# Patient Record
Sex: FEMALE | Race: WHITE | NOT HISPANIC OR LATINO | Employment: OTHER | URBAN - METROPOLITAN AREA
[De-identification: names, ages, dates, MRNs, and addresses within clinical notes are randomized per-mention and may not be internally consistent; named-entity substitution may affect disease eponyms.]

---

## 2017-01-22 DIAGNOSIS — I10 HTN (HYPERTENSION), BENIGN: Primary | ICD-10-CM

## 2017-01-23 RX ORDER — HYDROCHLOROTHIAZIDE 12.5 MG/1
12.5 TABLET ORAL DAILY
Qty: 30 TABLET | Refills: 0 | Status: SHIPPED | OUTPATIENT
Start: 2017-01-23 | End: 2017-03-16

## 2017-01-23 RX ORDER — METOPROLOL SUCCINATE 100 MG/1
100 TABLET, EXTENDED RELEASE ORAL DAILY
Qty: 30 TABLET | Refills: 0 | Status: SHIPPED | OUTPATIENT
Start: 2017-01-23 | End: 2017-03-16

## 2017-01-23 NOTE — TELEPHONE ENCOUNTER
HCTZ,     Metoprolol  Last Written Prescription Date: 10/25/16  Last Fill Quantity: 90, # refills: 0  Last Office Visit with G, [facility] or [facility] prescribing provider: 04/12/16       POTASSIUM   Date Value Ref Range Status   04/12/2016 3.7 3.4 - 5.3 mmol/L Final     CREATININE   Date Value Ref Range Status   11/11/2015 0.70 0.52 - 1.04 mg/dL Final     BP Readings from Last 3 Encounters:   07/08/16 155/92   04/12/16 138/70   11/11/15 126/80     Nicole Maloney MA

## 2017-01-23 NOTE — TELEPHONE ENCOUNTER
Routing refill request to provider for review/approval because:  Overdue for follow up appointment.    Patient notified.     Beata Diop RN, BSN

## 2017-02-07 ENCOUNTER — TRANSFERRED RECORDS (OUTPATIENT)
Dept: HEALTH INFORMATION MANAGEMENT | Facility: CLINIC | Age: 72
End: 2017-02-07

## 2017-02-08 ENCOUNTER — TRANSFERRED RECORDS (OUTPATIENT)
Dept: HEALTH INFORMATION MANAGEMENT | Facility: CLINIC | Age: 72
End: 2017-02-08

## 2017-02-09 ENCOUNTER — TELEPHONE (OUTPATIENT)
Dept: FAMILY MEDICINE | Facility: CLINIC | Age: 72
End: 2017-02-09

## 2017-02-09 NOTE — TELEPHONE ENCOUNTER
Reason for call:  Patient reporting a symptom    Symptom or request: Pain and Numbness in Right Leg      Duration (how long have symptoms been present): 3 days    Have you been treated for this before? Yes    Additional comments: she in in [place] and has gone to the ER    Phone Number patient can be reached at:  Cell number on file:    Telephone Information:   Mobile [phone]       Best Time:  Anytime    Can we leave a detailed message on this number:  YES    Call taken on 2/9/2017 at 7:05 AM by Benito Rg

## 2017-02-09 NOTE — TELEPHONE ENCOUNTER
"Call back to patient.  She reports she started having right thigh pain.  She denies injury, was seen in ED in [place] for this where nothing is found on exam, imaging, or lab work.  She states she also has \"this numb feeling in my leg.\"  She states ED provider told her it is likely a pinched nerve.  She states there is no discoloration, swelling, heat, or tenderness in any part of her leg, no limits to function either.  She will be flying back from [place] tomorrow, offered f/u with PCP next week.  Scheduled patient for 2/14, ED precautions advised for worsening symptoms.  Patient verbalizes understanding, has no further questions.  Patient will bring all results from ED visit with her to appt.  ISAÍAS sent to PCP.  Gabi Aguilar RN       "

## 2017-02-16 ENCOUNTER — OFFICE VISIT (OUTPATIENT)
Dept: FAMILY MEDICINE | Facility: CLINIC | Age: 72
End: 2017-02-16
Payer: MEDICARE

## 2017-02-16 VITALS
HEART RATE: 81 BPM | OXYGEN SATURATION: 93 % | BODY MASS INDEX: 29.73 KG/M2 | WEIGHT: 185 LBS | SYSTOLIC BLOOD PRESSURE: 166 MMHG | TEMPERATURE: 99.3 F | HEIGHT: 66 IN | DIASTOLIC BLOOD PRESSURE: 92 MMHG

## 2017-02-16 DIAGNOSIS — J06.9 UPPER RESPIRATORY TRACT INFECTION, UNSPECIFIED TYPE: Primary | ICD-10-CM

## 2017-02-16 DIAGNOSIS — M51.26 LUMBAR DISC HERNIATION: ICD-10-CM

## 2017-02-16 PROCEDURE — 99214 OFFICE O/P EST MOD 30 MIN: CPT | Performed by: PHYSICIAN ASSISTANT

## 2017-02-16 RX ORDER — METHYLPREDNISOLONE 4 MG
TABLET, DOSE PACK ORAL
COMMUNITY
Start: 2017-02-13 | End: 2018-03-08

## 2017-02-16 RX ORDER — AZITHROMYCIN 250 MG/1
TABLET, FILM COATED ORAL
Qty: 6 TABLET | Refills: 0 | Status: SHIPPED | OUTPATIENT
Start: 2017-02-16 | End: 2018-03-08

## 2017-02-16 RX ORDER — HYDROCODONE BITARTRATE AND ACETAMINOPHEN 5; 325 MG/1; MG/1
TABLET ORAL
COMMUNITY
Start: 2017-02-14 | End: 2018-03-08

## 2017-02-16 NOTE — MR AVS SNAPSHOT
"              After Visit Summary   2/16/2017    Carmel Nazario    MRN: [id number]           Patient Information     Date Of Birth          1945        Visit Information        Provider Department      2/16/2017 2:00 PM Rimma Casarez PA-C [facility]        Today's Diagnoses     Lumbar disc herniation    -  1    Upper respiratory tract infection, unspecified type           Follow-ups after your visit        Who to contact     If you have questions or need follow up information about today's clinic visit or your schedule please contact [facility] directly at [phone].  Normal or non-critical lab and imaging results will be communicated to you by [employer], letter or phone within 4 business days after the clinic has received the results. If you do not hear from us within 7 days, please contact the clinic through [employer] or phone. If you have a critical or abnormal lab result, we will notify you by phone as soon as possible.  Submit refill requests through [facility] or call your pharmacy and they will forward the refill request to us. Please allow 3 business days for your refill to be completed.          Additional Information About Your Visit        MyChart Information     [facility] [facility] [employer] gives you secure access to your electronic health record. If you see a primary care provider, you can also send messages to your care team and make appointments. If you have questions, please call your primary care clinic.  If you do not have a primary care provider, please call [phone] and they will assist you.        Care EveryWhere ID     This is your Care EveryWhere ID. This could be used by other organizations to access your [facility] medical records  [id number]        Your Vitals Were     Pulse Temperature Height Pulse Oximetry Breastfeeding? BMI (Body Mass Index)    81 99.3  F (37.4  C) (Oral) 5' 6\" (1.676 m) 93% No 29.86 kg/m2       Blood Pressure from Last 3 Encounters:   02/16/17 (!) 166/92 "   07/08/16 (!) 155/92   04/12/16 138/70    Weight from Last 3 Encounters:   02/16/17 185 lb (83.9 kg)   04/12/16 179 lb (81.2 kg)   11/11/15 183 lb 12.8 oz (83.4 kg)              Today, you had the following     No orders found for display         Today's Medication Changes          These changes are accurate as of: 2/16/17  2:21 PM.  If you have any questions, ask your nurse or doctor.               Start taking these medicines.        Dose/Directions    azithromycin 250 MG tablet   Commonly known as:  ZITHROMAX   Used for:  Upper respiratory tract infection, unspecified type   Started by:  Rimma Casarez PA-C        Two tablets first day, then one tablet daily for four days.   Quantity:  6 tablet   Refills:  0            Where to get your medicines      These medications were sent to [facility] [place], [place] - [address], [address]  [address], [address], [facility] [address]     Phone:  [phone]     azithromycin 250 MG tablet                Primary Care Provider Office Phone # Fax #    Rimma Casarez PA-C [phone]       [facility] [address]  [facility] [address]        Thank you!     Thank you for choosing [facility]  for your care. Our goal is always to provide you with excellent care. Hearing back from our patients is one way we can continue to improve our services. Please take a few minutes to complete the written survey that you may receive in the mail after your visit with us. Thank you!             Your Updated Medication List - Protect others around you: Learn how to safely use, store and throw away your medicines at www.disposemymeds.org.          This list is accurate as of: 2/16/17  2:21 PM.  Always use your most recent med list.                   Brand Name Dispense Instructions for use    azithromycin 250 MG tablet    ZITHROMAX    6 tablet    Two tablets first day, then one tablet daily for four days.        clobetasol 0.05 % cream    TEMOVATE    60 g    Apply sparingly to affected area twice daily as needed.  Do not apply to face.       hydrochlorothiazide 12.5 MG Tabs tablet     30 tablet    Take 1 tablet (12.5 mg) by mouth daily Overdue for appointment.  Call to schedule: [phone]       HYDROcodone-acetaminophen 5-325 MG per tablet    NORCO         methylPREDNISolone 4 MG tablet    MEDROL DOSEPAK         metoprolol 100 MG 24 hr tablet    TOPROL-XL    30 tablet    Take 1 tablet (100 mg) by mouth daily Overdue for appointment.  Call to schedule: [phone]       potassium chloride SA 20 MEQ CR tablet    K-DUR/KLOR-CON M    90 tablet    TAKE 1 TABLET DAILY       XALATAN 0.005 % ophthalmic solution   Generic drug:  latanoprost      1 drop At Bedtime.

## 2017-02-16 NOTE — PROGRESS NOTES
HPI: this is a pleasant 72 yo female here with complaint of cold sxs  She is leaving for [place] in a week and doesn't want to be sick for her trip    She is still having R leg numbness and pain starting acutely 2/8/17 when in [place]  Denies any falls or injuries  She was in [place] and went to ED on 2/8/17 twice for R leg numbness  She had labs and CT abd without cause found for her sxs  MRI done last week at [facility] shows herniated disc and has appt with Dr. Napier at [facility] coming up  Currently on Medrol dose pack and vicodin for the pain which does help    Her cold sxs include cough and congestion x one week  She has nasal congestion and sinus pressure x one week  Denies sore throat  Has post nasal drip but no runny nose.  Had some chills today and temp 99 today  She is exhausted from this and pain  Cough is prod of phlegm that she swallows.    Past Medical History   Diagnosis Date     Anxiety      Colon polyp      colonoscopy 8/2006     Fatty liver disease, nonalcoholic      Hip bursitis      HTN (hypertension), benign      Hyperlipidemia LDL goal < 100      OA (osteoarthritis) of hip      Dr. Garnica     Osteopenia      tried fosamax, but needed bone graft to jaw     PSVT (paroxysmal supraventricular tachycardia) (H)      Renal lithiasis      Skin cancer      BCC s/p Mohs     Past Surgical History   Procedure Laterality Date     Laparoscopic cholecystectomy       Corrective eye surgeries       C total hip arthroplasty  5-2014     R     Social History   Substance Use Topics     Smoking status: Former Smoker     Quit date: 10/24/1987     Smokeless tobacco: Never Used     Alcohol use 0.0 oz/week     0 Standard drinks or equivalent per week      Comment: 4 drinks per week     Current Outpatient Prescriptions   Medication Sig Dispense Refill     methylPREDNISolone (MEDROL DOSEPAK) 4 MG tablet        HYDROcodone-acetaminophen (NORCO) 5-325 MG per tablet        azithromycin (ZITHROMAX) 250 MG tablet Two tablets first day, then one  "tablet daily for four days. 6 tablet 0     metoprolol (TOPROL-XL) 100 MG 24 hr tablet Take 1 tablet (100 mg) by mouth daily Overdue for appointment.  Call to schedule: [phone] 30 tablet 0     hydrochlorothiazide 12.5 MG TABS tablet Take 1 tablet (12.5 mg) by mouth daily Overdue for appointment.  Call to schedule: [phone] 30 tablet 0     potassium chloride SA (K-DUR,KLOR-CON M) 20 MEQ tablet TAKE 1 TABLET DAILY 90 tablet 3     clobetasol (TEMOVATE) 0.05 % cream Apply sparingly to affected area twice daily as needed.  Do not apply to face. 60 g 1     latanoprost (XALATAN) 0.005 % ophthalmic solution 1 drop At Bedtime.       Allergies   Allergen Reactions     Fosamax [Alendronate Sodium]      Jaw problems     Penicillins      rash     Sulfa Drugs      GI upset     FAMILY HISTORY NOTED AND REVIEWED  PHYSICAL EXAM:    BP (!) 166/92 (BP Location: Right arm, Patient Position: Chair, Cuff Size: Adult Regular)  Pulse 81  Temp 99.3  F (37.4  C) (Oral)  Ht 5' 6\" (1.676 m)  Wt 185 lb (83.9 kg)  SpO2 93%  Breastfeeding? No  BMI 29.86 kg/m2  EXAM:  GENERAL APPEARANCE: healthy, alert and no distress  EYES: Eyes grossly normal to inspection, PERRL and conjunctivae and sclerae normal  HENT: ear canals and TM's normal and nose and mouth without ulcers or lesions  NECK: no adenopathy, no asymmetry, masses, or scars and thyroid normal to palpation  RESP: lungs clear to auscultation - no rales, rhonchi or wheezes  CV: regular rates and rhythm, normal S1 S2, no S3 or S4 and no murmur, click or rub  SKIN: no suspicious lesions or rashes    Assessment and Plan:     (J06.9) Upper respiratory tract infection, unspecified type  (primary encounter diagnosis)  Comment: suspect this viral.  Recd she push fluids, rest, Bartolome DM.    Plan: azithromycin (ZITHROMAX) 250 MG tablet        Antibiotic written for her to take if worse over the w/e with fever or cough.  Bring on trip in case she gets worse.  Otherwise, recd she wait this out as " suspect this viral    (M51.26) Lumbar disc herniation  Comment: reviewed her ED records from [place] and will make copies for chart  Plan: will request records from [facility]. She is on a medrol dose pack and vicodin as prescribed by them.  Has appt coming up with Dr. Napier.    Spent 30 minutes FTF with patient of which over 50% was spent discussing the coordination of care and management of their issues noted/hosp reviewed.    Rimma Casarez PA-C

## 2017-02-16 NOTE — NURSING NOTE
"Chief Complaint   Patient presents with     Sinus Problem     stuffy, headache, cough, congestion, fatigue ongoing for awhile seem to have gotten worse     Leg Pain     pain, numbness R upper leg due to herniated disc       Initial BP (!) 166/92 (BP Location: Right arm, Patient Position: Chair, Cuff Size: Adult Regular)  Pulse 81  Temp 99.3  F (37.4  C) (Oral)  Ht 5' 6\" (1.676 m)  Wt 185 lb (83.9 kg)  SpO2 93%  Breastfeeding? No  BMI 29.86 kg/m2 Estimated body mass index is 29.86 kg/(m^2) as calculated from the following:    Height as of this encounter: 5' 6\" (1.676 m).    Weight as of this encounter: 185 lb (83.9 kg).  Medication Reconciliation: complete     DAQUAN Sinclair      "

## 2017-02-18 ENCOUNTER — TELEPHONE (OUTPATIENT)
Dept: FAMILY MEDICINE | Facility: CLINIC | Age: 72
End: 2017-02-18

## 2017-02-18 NOTE — TELEPHONE ENCOUNTER
"02/18/17      Call not made patient goes to [facility] \"Rosalia\" Jerel  Central Scheduler      "

## 2017-02-20 ENCOUNTER — MYC MEDICAL ADVICE (OUTPATIENT)
Dept: FAMILY MEDICINE | Facility: CLINIC | Age: 72
End: 2017-02-20

## 2017-02-20 NOTE — TELEPHONE ENCOUNTER
For Rimma Casarez, No action needed:    To PCP,  Please see patient message.  Virgen Olivares RN

## 2017-05-31 ENCOUNTER — TRANSFERRED RECORDS (OUTPATIENT)
Dept: HEALTH INFORMATION MANAGEMENT | Facility: CLINIC | Age: 72
End: 2017-05-31

## 2017-11-02 DIAGNOSIS — I10 BENIGN HYPERTENSION: ICD-10-CM

## 2017-11-02 RX ORDER — POTASSIUM CHLORIDE 1500 MG/1
TABLET, EXTENDED RELEASE ORAL
Qty: 90 TABLET | Refills: 0 | Status: SHIPPED | OUTPATIENT
Start: 2017-11-02 | End: 2018-02-05

## 2017-11-19 DIAGNOSIS — I10 HTN (HYPERTENSION), BENIGN: ICD-10-CM

## 2017-11-21 RX ORDER — METOPROLOL SUCCINATE 100 MG/1
TABLET, EXTENDED RELEASE ORAL
Qty: 30 TABLET | Refills: 0 | Status: SHIPPED | OUTPATIENT
Start: 2017-11-21 | End: 2018-03-08

## 2017-11-21 NOTE — TELEPHONE ENCOUNTER
Medication is being filled for 1 time refill only due to:  Patient needs to be seen because overdue for physical and fasting labs

## 2017-11-28 ENCOUNTER — TRANSFERRED RECORDS (OUTPATIENT)
Dept: HEALTH INFORMATION MANAGEMENT | Facility: CLINIC | Age: 72
End: 2017-11-28

## 2017-12-04 ENCOUNTER — TRANSFERRED RECORDS (OUTPATIENT)
Dept: HEALTH INFORMATION MANAGEMENT | Facility: CLINIC | Age: 72
End: 2017-12-04

## 2017-12-19 ENCOUNTER — TELEPHONE (OUTPATIENT)
Dept: FAMILY MEDICINE | Facility: CLINIC | Age: 72
End: 2017-12-19

## 2017-12-19 NOTE — TELEPHONE ENCOUNTER
Pt had routine mammogram on 11/28/17, was to have follow up screening with additional imaging on rt breast.   Left message at [facility] imaging [phone] for report to be faxed to us.   nadercma

## 2017-12-31 DIAGNOSIS — I10 HTN (HYPERTENSION), BENIGN: ICD-10-CM

## 2018-01-02 RX ORDER — HYDROCHLOROTHIAZIDE 12.5 MG/1
TABLET ORAL
Qty: 30 TABLET | Refills: 0 | Status: SHIPPED | OUTPATIENT
Start: 2018-01-02 | End: 2018-03-08

## 2018-01-02 NOTE — TELEPHONE ENCOUNTER
Requested Prescriptions   Pending Prescriptions Disp Refills     hydrochlorothiazide 12.5 MG TABS tablet [Pharmacy Med Name: HYDROCHLOROTHIAZIDE TABS 12.5MG] 90 tablet 1    Last Written Prescription Date:  4/07/17  Last Fill Quantity: 90 tablet,  # refills: 1   Last Office Visit with [facility], [facility] or [facility] prescribing provider:  2/16/17   Future Office Visit:      Sig: TAKE 1 TABLET DAILY    Diuretics (Including Combos) Protocol Failed    12/31/2017  5:00 AM       Failed - Blood pressure under 140/90    BP Readings from Last 3 Encounters:   02/16/17 (!) 166/92   07/08/16 (!) 155/92   04/12/16 138/70                Failed - Normal serum creatinine on file in past 12 months    Recent Labs   Lab Test  11/11/15   0949   CR  0.70             Failed - Normal serum potassium on file in past 12 months    Recent Labs   Lab Test  04/12/16   1359   POTASSIUM  3.7                   Failed - Normal serum sodium on file in past 12 months    Recent Labs   Lab Test  11/11/15   0949   NA  138             Passed - Recent or future visit with authorizing provider's specialty    Patient had office visit in the last year or has a visit in the next 30 days with authorizing provider.  See chart review.              Passed - Patient is age 18 or older       Passed - No active pregancy on record       Passed - No positive pregnancy test in past 12 months

## 2018-01-02 NOTE — TELEPHONE ENCOUNTER
Medication is being filled for 1 time refill only due to:  Patient needs to be seen because due for PE and fasting labs.

## 2018-03-08 ENCOUNTER — OFFICE VISIT (OUTPATIENT)
Dept: FAMILY MEDICINE | Facility: CLINIC | Age: 73
End: 2018-03-08
Payer: MEDICARE

## 2018-03-08 VITALS
DIASTOLIC BLOOD PRESSURE: 80 MMHG | WEIGHT: 184 LBS | HEART RATE: 92 BPM | OXYGEN SATURATION: 92 % | TEMPERATURE: 97.4 F | HEIGHT: 66 IN | BODY MASS INDEX: 29.57 KG/M2 | SYSTOLIC BLOOD PRESSURE: 146 MMHG

## 2018-03-08 DIAGNOSIS — Z00.00 ENCOUNTER FOR ROUTINE ADULT HEALTH EXAMINATION WITHOUT ABNORMAL FINDINGS: Primary | ICD-10-CM

## 2018-03-08 DIAGNOSIS — R73.9 HYPERGLYCEMIA: ICD-10-CM

## 2018-03-08 DIAGNOSIS — E78.5 HYPERLIPIDEMIA WITH TARGET LDL LESS THAN 100: ICD-10-CM

## 2018-03-08 DIAGNOSIS — I47.10 PSVT (PAROXYSMAL SUPRAVENTRICULAR TACHYCARDIA) (H): ICD-10-CM

## 2018-03-08 DIAGNOSIS — K76.0 FATTY LIVER DISEASE, NONALCOHOLIC: ICD-10-CM

## 2018-03-08 DIAGNOSIS — I10 HTN (HYPERTENSION), BENIGN: ICD-10-CM

## 2018-03-08 LAB
ALBUMIN SERPL-MCNC: 4.2 G/DL (ref 3.4–5)
ALP SERPL-CCNC: 66 U/L (ref 40–150)
ALT SERPL W P-5'-P-CCNC: 72 U/L (ref 0–50)
ANION GAP SERPL CALCULATED.3IONS-SCNC: 8 MMOL/L (ref 3–14)
AST SERPL W P-5'-P-CCNC: 63 U/L (ref 0–45)
BILIRUB SERPL-MCNC: 1.6 MG/DL (ref 0.2–1.3)
BUN SERPL-MCNC: 11 MG/DL (ref 7–30)
CALCIUM SERPL-MCNC: 9.2 MG/DL (ref 8.5–10.1)
CHLORIDE SERPL-SCNC: 103 MMOL/L (ref 94–109)
CHOLEST SERPL-MCNC: 215 MG/DL
CO2 SERPL-SCNC: 26 MMOL/L (ref 20–32)
CREAT SERPL-MCNC: 0.67 MG/DL (ref 0.52–1.04)
ERYTHROCYTE [DISTWIDTH] IN BLOOD BY AUTOMATED COUNT: 12.5 % (ref 10–15)
GFR SERPL CREATININE-BSD FRML MDRD: 86 ML/MIN/1.7M2
GLUCOSE SERPL-MCNC: 128 MG/DL (ref 70–99)
HBA1C MFR BLD: 5.9 % (ref 4.3–6)
HCT VFR BLD AUTO: 45.7 % (ref 35–47)
HDLC SERPL-MCNC: 39 MG/DL
HGB BLD-MCNC: 15.4 G/DL (ref 11.7–15.7)
LDLC SERPL CALC-MCNC: 147 MG/DL
MCH RBC QN AUTO: 33.8 PG (ref 26.5–33)
MCHC RBC AUTO-ENTMCNC: 33.7 G/DL (ref 31.5–36.5)
MCV RBC AUTO: 100 FL (ref 78–100)
NONHDLC SERPL-MCNC: 176 MG/DL
PLATELET # BLD AUTO: 202 10E9/L (ref 150–450)
POTASSIUM SERPL-SCNC: 4 MMOL/L (ref 3.4–5.3)
PROT SERPL-MCNC: 7.6 G/DL (ref 6.8–8.8)
RBC # BLD AUTO: 4.55 10E12/L (ref 3.8–5.2)
SODIUM SERPL-SCNC: 137 MMOL/L (ref 133–144)
TRIGL SERPL-MCNC: 144 MG/DL
WBC # BLD AUTO: 5.6 10E9/L (ref 4–11)

## 2018-03-08 PROCEDURE — 80053 COMPREHEN METABOLIC PANEL: CPT | Performed by: PHYSICIAN ASSISTANT

## 2018-03-08 PROCEDURE — 83036 HEMOGLOBIN GLYCOSYLATED A1C: CPT | Performed by: PHYSICIAN ASSISTANT

## 2018-03-08 PROCEDURE — 80061 LIPID PANEL: CPT | Performed by: PHYSICIAN ASSISTANT

## 2018-03-08 PROCEDURE — G0438 PPPS, INITIAL VISIT: HCPCS | Performed by: PHYSICIAN ASSISTANT

## 2018-03-08 PROCEDURE — 36415 COLL VENOUS BLD VENIPUNCTURE: CPT | Performed by: PHYSICIAN ASSISTANT

## 2018-03-08 PROCEDURE — 85027 COMPLETE CBC AUTOMATED: CPT | Performed by: PHYSICIAN ASSISTANT

## 2018-03-08 RX ORDER — HYDROCHLOROTHIAZIDE 12.5 MG/1
12.5 TABLET ORAL DAILY
Qty: 90 TABLET | Refills: 3 | Status: CANCELLED | OUTPATIENT
Start: 2018-03-08

## 2018-03-08 RX ORDER — METOPROLOL SUCCINATE 100 MG/1
100 TABLET, EXTENDED RELEASE ORAL DAILY
Qty: 90 TABLET | Refills: 3 | Status: SHIPPED | OUTPATIENT
Start: 2018-03-08 | End: 2019-02-10

## 2018-03-08 RX ORDER — HYDROCHLOROTHIAZIDE 25 MG/1
25 TABLET ORAL DAILY
Qty: 90 TABLET | Refills: 3 | Status: SHIPPED | OUTPATIENT
Start: 2018-03-08 | End: 2019-02-10

## 2018-03-08 NOTE — PROGRESS NOTES
SUBJECTIVE:   Carmel Nazario is a 72 year old female who presents for Preventive Visit.    Pt states her BP runs high initially but eventually comes down  She was just on a cruise so wasn't watching her diet  Colonoscopy and mammogram up to date.  She has hx of skin ca and overdue for exam  Are you in the first 12 months of your Medicare Part B coverage?  No    Healthy Habits:    Do you get at least three servings of calcium containing foods daily (dairy, green leafy vegetables, etc.)? yes    Amount of exercise or daily activities, outside of work: 3 day(s) per week water aerobics then walking in nicer weather    Problems taking medications regularly No, but did not take HCTZ this morning since fasting    Medication side effects: No    Have you had an eye exam in the past two years? yes    Do you see a dentist twice per year? yes    Do you have sleep apnea, excessive snoring or daytime drowsiness?no      Ability to successfully perform activities of daily living: Yes, no assistance needed    Home safety:  none identified     Hearing impairment: No    Fall risk:  Fallen 2 or more times in the past year?: No  Any fall with injury in the past year?: No        COGNITIVE SCREEN  1) Repeat 3 items (Banana, Sunrise, Chair)    2) Clock draw: NORMAL  3) 3 item recall: Recalls 3 objects  Results: 3 items recalled: COGNITIVE IMPAIRMENT LESS LIKELY    Mini-CogTM Copyright S Ha. Licensed by the author for use in [facility]; reprinted with permission ([email or web address]). All rights reserved.      Reviewed and updated as needed this visit by clinical staff  Tobacco  Allergies  Meds         Reviewed and updated as needed this visit by Provider  Allergies        Social History   Substance Use Topics     Smoking status: Former Smoker     Quit date: 10/24/1987     Smokeless tobacco: Never Used     Alcohol use 0.0 oz/week     0 Standard drinks or equivalent per week      Comment: 4 drinks per week       If you drink  alcohol do you typically have >3 drinks per day or >7 drinks per week? No                        Today's PHQ-2 Score:   PHQ-2 ( 1999 [employer]) 3/8/2018 3/8/2018   Q1: Little interest or pleasure in doing things 0 0   Q2: Feeling down, depressed or hopeless 0 0   PHQ-2 Score 0 0       Do you feel safe in your environment - Yes    Do you have a Health Care Directive?: Yes: Advance Directive has been received and scanned.    Current providers sharing in care for this patient include:   Patient Care Team:  Rimma Casarez PA-C as PCP - General (Internal Medicine)    The following health maintenance items are reviewed in Epic and correct as of today:  Health Maintenance   Topic Date Due     MEDICARE ANNUAL WELLNESS VISIT  09/10/1963     HEPATITIS C SCREENING  [birth year]10/1963     FALL RISK ASSESSMENT  04/12/2017     ADVANCE DIRECTIVE PLANNING Q5 YRS  08/02/2017     INFLUENZA VACCINE (SYSTEM ASSIGNED)  09/01/2018     MAMMO SCREEN Q2 YR (SYSTEM ASSIGNED)  12/04/2019     LIPID SCREEN Q5 YR FEMALE (SYSTEM ASSIGNED)  11/11/2020     COLONOSCOPY Q5 YR  05/31/2022     TETANUS IMMUNIZATION (SYSTEM ASSIGNED)  11/01/2022     DEXA SCAN SCREENING (SYSTEM ASSIGNED)  Completed     PNEUMOCOCCAL  Completed     Past Medical History:   Diagnosis Date     Anxiety      Colon polyp     colonoscopy 8/2006     Fatty liver disease, nonalcoholic      Hip bursitis      HTN (hypertension), benign      Hyperlipidemia LDL goal < 100      OA (osteoarthritis) of hip     Dr. Garnica     Osteopenia     tried fosamax, but needed bone graft to jaw     PSVT (paroxysmal supraventricular tachycardia) (H)      Renal lithiasis      Skin cancer     BCC s/p Mohs     Past Surgical History:   Procedure Laterality Date     C TOTAL HIP ARTHROPLASTY  5-2014    R     corrective eye surgeries       LAPAROSCOPIC CHOLECYSTECTOMY       Current Outpatient Prescriptions   Medication Sig Dispense Refill     metoprolol succinate (TOPROL-XL) 100 MG 24 hr tablet Take 1 tablet (100 mg)  "by mouth daily 90 tablet 3     hydrochlorothiazide (HYDRODIURIL) 25 MG tablet Take 1 tablet (25 mg) by mouth daily 90 tablet 3     potassium chloride SA (K-DUR/KLOR-CON M) 20 MEQ CR tablet Take 1 tablet (20 mEq) by mouth daily Due for labs 90 tablet 0     latanoprost (XALATAN) 0.005 % ophthalmic solution 1 drop At Bedtime.       [DISCONTINUED] hydrochlorothiazide 12.5 MG TABS tablet TAKE 1 TABLET DAILY 30 tablet 0     [DISCONTINUED] metoprolol (TOPROL-XL) 100 MG 24 hr tablet TAKE 1 TABLET DAILY 30 tablet 0       ROS:  Constitutional, HEENT, cardiovascular, pulmonary, GI, , musculoskeletal, neuro, skin, endocrine and psych systems are negative, except as otherwise noted.    OBJECTIVE:   /80 (BP Location: Left arm, Patient Position: Chair, Cuff Size: Adult Regular)  Pulse 92  Temp 97.4  F (36.3  C) (Tympanic)  Ht 5' 6\" (1.676 m)  Wt 184 lb (83.5 kg)  SpO2 92%  Breastfeeding? No  BMI 29.7 kg/m2 Estimated body mass index is 29.7 kg/(m^2) as calculated from the following:    Height as of this encounter: 5' 6\" (1.676 m).    Weight as of this encounter: 184 lb (83.5 kg).  EXAM:   GENERAL APPEARANCE: healthy, alert and no distress  EYES: Eyes grossly normal to inspection, PERRL and conjunctivae and sclerae normal  HENT: ear canals and TM's normal, nose and mouth without ulcers or lesions, oropharynx clear and oral mucous membranes moist  NECK: no adenopathy, no asymmetry, masses, or scars and thyroid normal to palpation  RESP: lungs clear to auscultation - no rales, rhonchi or wheezes  BREAST: normal without masses, tenderness or nipple discharge and no palpable axillary masses or adenopathy  CV: regular rate and rhythm, normal S1 S2, no S3 or S4, no murmur, click or rub, no peripheral edema and peripheral pulses strong  ABDOMEN: soft, nontender, no hepatosplenomegaly, no masses and bowel sounds normal  MS: no musculoskeletal defects are noted and gait is age appropriate without ataxia  SKIN: no suspicious " "lesions or rashes  NEURO: Normal strength and tone, sensory exam grossly normal, mentation intact and speech normal  PSYCH: mentation appears normal and affect normal/bright    ASSESSMENT / PLAN:   Assessment and Plan:     (Z00.00) Encounter for routine adult health examination without abnormal findings  (primary encounter diagnosis)  Comment: mammogram and colonoscopy up to date. Declines DEXA  Plan: CBC with platelets        immun up to date    (I10) HTN (hypertension), benign  Comment: not well controlled, increased hctz to 25mg qd. F/u one month with readings.  It did come down some on recheck in office today.  Plan: metoprolol succinate (TOPROL-XL) 100 MG 24 hr         tablet, Comprehensive metabolic panel,         hydrochlorothiazide (HYDRODIURIL) 25 MG tablet            (E78.5) Hyperlipidemia with target LDL less than 100  Comment:   Plan: Lipid Profile            (I47.1) PSVT (paroxysmal supraventricular tachycardia) (H)  Comment:   Plan: stable, asymptomatic    (K76.0) Fatty liver disease, nonalcoholic  Comment:   Plan: CMP today    (R73.9) Hyperglycemia  Comment:   Plan: Hemoglobin A1c              End of Life Planning:  Patient currently has an advanced directive: No.  I have verified the patient's ablity to prepare an advanced directive/make health care decisions.  Literature was provided to assist patient in preparing an advanced directive.    COUNSELING:  Reviewed preventive health counseling, as reflected in patient instructions        Estimated body mass index is 29.7 kg/(m^2) as calculated from the following:    Height as of this encounter: 5' 6\" (1.676 m).    Weight as of this encounter: 184 lb (83.5 kg).       reports that she quit smoking about 30 years ago. She has never used smokeless tobacco.      Appropriate preventive services were discussed with this patient, including applicable screening as appropriate for cardiovascular disease, diabetes, osteopenia/osteoporosis, and glaucoma.  As " appropriate for age/gender, discussed screening for colorectal cancer, prostate cancer, breast cancer, and cervical cancer. Checklist reviewing preventive services available has been given to the patient.    Reviewed patients plan of care and provided an AVS. The Basic Care Plan (routine screening as documented in Health Maintenance) for Carmel meets the Care Plan requirement. This Care Plan has been established and reviewed with the Patient.    Counseling Resources:  ATP IV Guidelines  Pooled Cohorts Equation Calculator  Breast Cancer Risk Calculator  FRAX Risk Assessment  ICSI Preventive Guidelines  Dietary Guidelines for Americans, 2010  USDA's MyPlate  ASA Prophylaxis  Lung CA Screening    Rimma Casarez PA-C  [facility]

## 2018-03-08 NOTE — NURSING NOTE
"Chief Complaint   Patient presents with     Wellness Visit       Initial /80 (BP Location: Left arm, Patient Position: Chair, Cuff Size: Adult Regular)  Pulse 92  Temp 97.4  F (36.3  C) (Tympanic)  Ht 5' 6\" (1.676 m)  Wt 184 lb (83.5 kg)  SpO2 92%  Breastfeeding? No  BMI 29.7 kg/m2 Estimated body mass index is 29.7 kg/(m^2) as calculated from the following:    Height as of this encounter: 5' 6\" (1.676 m).    Weight as of this encounter: 184 lb (83.5 kg).  Medication Reconciliation: complete    "

## 2018-03-08 NOTE — MR AVS SNAPSHOT
After Visit Summary   3/8/2018    Carmel Nazario    MRN: [id number]           Patient Information     Date Of Birth          1945        Visit Information        Provider Department      3/8/2018 9:30 AM Rimma Casarez PA-C [facility]        Today's Diagnoses     Encounter for routine adult health examination without abnormal findings    -  1    HTN (hypertension), benign        Hyperlipidemia with target LDL less than 100        PSVT (paroxysmal supraventricular tachycardia) (H)        Fatty liver disease, nonalcoholic        Hyperglycemia          Care Instructions      Preventive Health Recommendations    Female Ages 65 +    Yearly exam:     See your health care provider every year in order to  o Review health changes.   o Discuss preventive care.    o Review your medicines if your doctor has prescribed any.      You no longer need a yearly Pap test unless you've had an abnormal Pap test in the past 10 years. If you have vaginal symptoms, such as bleeding or discharge, be sure to talk with your provider about a Pap test.      Every 1 to 2 years, have a mammogram.  If you are over 69, talk with your health care provider about whether or not you want to continue having screening mammograms.      Every 10 years, have a colonoscopy. Or, have a yearly FIT test (stool test). These exams will check for colon cancer.       Have a cholesterol test every 5 years, or more often if your doctor advises it.       Have a diabetes test (fasting glucose) every three years. If you are at risk for diabetes, you should have this test more often.       At age 65, have a bone density scan (DEXA) to check for osteoporosis (brittle bone disease).    Shots:    Get a flu shot each year.    Get a tetanus shot every 10 years.    Talk to your doctor about your pneumonia vaccines. There are now two you should receive - Pneumovax (PPSV 23) and Prevnar (PCV 13).    Talk to your doctor about the shingles  vaccine.    Talk to your doctor about the hepatitis B vaccine.    Nutrition:     Eat at least 5 servings of fruits and vegetables each day.      Eat whole-grain bread, whole-wheat pasta and brown rice instead of white grains and rice.      Talk to your provider about Calcium and Vitamin D.     Lifestyle    Exercise at least 150 minutes a week (30 minutes a day, 5 days a week). This will help you control your weight and prevent disease.      Limit alcohol to one drink per day.      No smoking.       Wear sunscreen to prevent skin cancer.       See your dentist twice a year for an exam and cleaning.      See your eye doctor every 1 to 2 years to screen for conditions such as glaucoma, macular degeneration and cataracts.          Follow-ups after your visit        Who to contact     If you have questions or need follow up information about today's clinic visit or your schedule please contact [facility] directly at [phone].  Normal or non-critical lab and imaging results will be communicated to you by MyChart, letter or phone within 4 business days after the clinic has received the results. If you do not hear from us within 7 days, please contact the clinic through ActiveSechart or phone. If you have a critical or abnormal lab result, we will notify you by phone as soon as possible.  Submit refill requests through G3 or call your pharmacy and they will forward the refill request to us. Please allow 3 business days for your refill to be completed.          Additional Information About Your Visit        ActiveSecharAiming Information     G3 gives you secure access to your electronic health record. If you see a primary care provider, you can also send messages to your care team and make appointments. If you have questions, please call your primary care clinic.  If you do not have a primary care provider, please call [phone] and they will assist you.        Care EveryWhere ID     This is your Care EveryWhere  "ID. This could be used by other organizations to access your [facility] medical records  [id number]        Your Vitals Were     Pulse Height Pulse Oximetry Breastfeeding? BMI (Body Mass Index)       92 5' 6\" (1.676 m) 92% No 29.7 kg/m2        Blood Pressure from Last 3 Encounters:   03/08/18 (!) 171/95   02/16/17 (!) 166/92   07/08/16 (!) 155/92    Weight from Last 3 Encounters:   03/08/18 184 lb (83.5 kg)   02/16/17 185 lb (83.9 kg)   04/12/16 179 lb (81.2 kg)              We Performed the Following     CBC with platelets     Comprehensive metabolic panel     Hemoglobin A1c     Lipid Profile          Today's Medication Changes          These changes are accurate as of 3/8/18  9:55 AM.  If you have any questions, ask your nurse or doctor.               These medicines have changed or have updated prescriptions.        Dose/Directions    * hydrochlorothiazide 12.5 MG Tabs tablet   This may have changed:  Another medication with the same name was added. Make sure you understand how and when to take each.   Used for:  HTN (hypertension), benign   Changed by:  Rimma Casarez PA-C        TAKE 1 TABLET DAILY   Quantity:  30 tablet   Refills:  0       * hydrochlorothiazide 25 MG tablet   Commonly known as:  HYDRODIURIL   This may have changed:  You were already taking a medication with the same name, and this prescription was added. Make sure you understand how and when to take each.   Used for:  HTN (hypertension), benign   Changed by:  Rimma Casarez PA-C        Dose:  25 mg   Take 1 tablet (25 mg) by mouth daily   Quantity:  90 tablet   Refills:  3       metoprolol succinate 100 MG 24 hr tablet   Commonly known as:  TOPROL-XL   This may have changed:  See the new instructions.   Used for:  HTN (hypertension), benign   Changed by:  Rimma Casarez PA-C        Dose:  100 mg   Take 1 tablet (100 mg) by mouth daily   Quantity:  90 tablet   Refills:  3       potassium chloride SA 20 MEQ CR tablet   Commonly known as: "  K-JOSÉ MIGUEL/KLOR-CON M   This may have changed:  Another medication with the same name was removed. Continue taking this medication, and follow the directions you see here.   Used for:  Benign hypertension   Changed by:  Rimma Casarez PA-C        Dose:  20 mEq   Take 1 tablet (20 mEq) by mouth daily Due for labs   Quantity:  90 tablet   Refills:  0       * Notice:  This list has 2 medication(s) that are the same as other medications prescribed for you. Read the directions carefully, and ask your doctor or other care provider to review them with you.         Where to get your medicines      These medications were sent to [facility] - [address]  [address] [place] [id number]     Phone:  [phone]     hydrochlorothiazide 25 MG tablet    metoprolol succinate 100 MG 24 hr tablet                Primary Care Provider Office Phone # Fax #    Rimma Casarez PA-C [phone] [address] [address]        Equal Access to Services     [facility]: Hadii aad ku hadasho Soomaali, waaxda luqadaha, qaybta kaalmada adeegyada, waxay idiin hayimanin brendan martel . So [facility] [phone].    ATENCIÓN: Si habla español, tiene a johnson disposición servicios gratuitos de asistencia lingüística. [facility] [phone].    We comply with applicable federal civil rights laws and [place] laws. We do not discriminate on the basis of race, color, national origin, age, disability, sex, sexual orientation, or gender identity.            Thank you!     Thank you for choosing [facility]  for your care. Our goal is always to provide you with excellent care. Hearing back from our patients is one way we can continue to improve our services. Please take a few minutes to complete the written survey that you may receive in the mail after your visit with us. Thank you!             Your Updated Medication List - Protect others around you:  Learn how to safely use, store and throw away your medicines at www.disposemymeds.org.          This list is accurate as of 3/8/18  9:55 AM.  Always use your most recent med list.                   Brand Name Dispense Instructions for use Diagnosis    * hydrochlorothiazide 12.5 MG Tabs tablet     30 tablet    TAKE 1 TABLET DAILY    HTN (hypertension), benign       * hydrochlorothiazide 25 MG tablet    HYDRODIURIL    90 tablet    Take 1 tablet (25 mg) by mouth daily    HTN (hypertension), benign       metoprolol succinate 100 MG 24 hr tablet    TOPROL-XL    90 tablet    Take 1 tablet (100 mg) by mouth daily    HTN (hypertension), benign       potassium chloride SA 20 MEQ CR tablet    K-DUR/KLOR-CON M    90 tablet    Take 1 tablet (20 mEq) by mouth daily Due for labs    Benign hypertension       XALATAN 0.005 % ophthalmic solution   Generic drug:  latanoprost      1 drop At Bedtime.        * Notice:  This list has 2 medication(s) that are the same as other medications prescribed for you. Read the directions carefully, and ask your doctor or other care provider to review them with you.

## 2018-03-13 NOTE — PROGRESS NOTES
"Carmel,    Your blood sugar was 128 with an A1c of 5.9% so that indicates prediabetes so watch the diet and try to lose weight.  Your electrolytes and kidney functions were normal.  Your liver functions (ALT/AST) were elevated which is likely due to your known fatty liver disease. The treatment for that is also weight loss.    Your cholesterol is elevated. Your LDL or \"bad cholesterol\" should be <130 and is 147.  I would like to put you on cholesterol medication with your permission. Please let me know.  This will help reduce your risk of heart attack and stroke.    Your hemoglobin and white blood cell count were in normal range.    Please get back to me regarding the cholesterol medication because I would want you on board with this before I put in the order.    Rimma Casarez PA-C      "

## 2018-05-14 DIAGNOSIS — I10 BENIGN HYPERTENSION: ICD-10-CM

## 2018-05-14 NOTE — TELEPHONE ENCOUNTER
" potassium chloride SA (K-DUR/KLOR-CON M) 20 MEQ CR tablet 90 tablet 0 2/5/2018     Last Written Prescription Date:  2/5/18  Last Fill Quantity: 90,  # refills: 0   Last office visit: 3/8/2018 with prescribing provider:  Rimma   Future Office Visit:  none  Requested Prescriptions   Pending Prescriptions Disp Refills     potassium chloride SA (K-DUR/KLOR-CON M) 20 MEQ CR tablet [Pharmacy Med Name: POT CHLOR ER TABS 20MEQ] 90 tablet 0     Sig: TAKE 1 TABLET DAILY (DUE FOR LABS)    Potassium Supplements Protocol Passed    5/14/2018 10:54 AM       Passed - Recent (12 mo) or future (30 days) visit within the authorizing provider's specialty    Patient had office visit in the last 12 months or has a visit in the next 30 days with authorizing provider or within the authorizing provider's specialty.  See \"Patient Info\" tab in inbasket, or \"Choose Columns\" in Meds & Orders section of the refill encounter.           Passed - Patient is age 18 or older       Passed - Normal serum potassium in past 12 months    Recent Labs   Lab Test  03/08/18   1018   POTASSIUM  4.0                    No flowsheet data found.  "

## 2018-05-15 RX ORDER — POTASSIUM CHLORIDE 1500 MG/1
TABLET, EXTENDED RELEASE ORAL
Qty: 90 TABLET | Refills: 1 | Status: SHIPPED | OUTPATIENT
Start: 2018-05-15 | End: 2018-11-06

## 2018-05-15 NOTE — TELEPHONE ENCOUNTER
Prescription approved per [facility] Refill Protocol.  Janine Freeman RN

## 2018-11-06 DIAGNOSIS — I10 BENIGN HYPERTENSION: ICD-10-CM

## 2018-11-06 RX ORDER — POTASSIUM CHLORIDE 1500 MG/1
20 TABLET, EXTENDED RELEASE ORAL DAILY
Qty: 90 TABLET | Refills: 0 | Status: SHIPPED | OUTPATIENT
Start: 2018-11-06 | End: 2019-02-04

## 2018-11-06 NOTE — TELEPHONE ENCOUNTER
Prescription approved per [facility] Refill Protocol.  Radha AL RN

## 2018-11-06 NOTE — TELEPHONE ENCOUNTER
"potassium chloride SA (K-DUR/KLOR-CON M) 20 MEQ CR tablet 90 tablet 1 5/15/2018     Last Written Prescription Date:  5/15/18  Last Fill Quantity: 90,  # refills: 1   Last office visit: 3/8/2018 with prescribing provider:  Leida   Future Office Visit:  none  Requested Prescriptions   Pending Prescriptions Disp Refills     potassium chloride SA (K-DUR/KLOR-CON M) 20 MEQ CR tablet [Pharmacy Med Name: POT CHLOR ER TABS 20MEQ] 90 tablet 1     Sig: TAKE 1 TABLET DAILY (DUE FOR LABS)    Potassium Supplements Protocol Passed    11/6/2018  1:16 AM       Passed - Recent (12 mo) or future (30 days) visit within the authorizing provider's specialty    Patient had office visit in the last 12 months or has a visit in the next 30 days with authorizing provider or within the authorizing provider's specialty.  See \"Patient Info\" tab in inbasket, or \"Choose Columns\" in Meds & Orders section of the refill encounter.             Passed - Patient is age 18 or older       Passed - Normal serum potassium in past 12 months    Recent Labs   Lab Test  03/08/18   1018   POTASSIUM  4.0                    No flowsheet data found.  "

## 2018-12-06 ENCOUNTER — TRANSFERRED RECORDS (OUTPATIENT)
Dept: HEALTH INFORMATION MANAGEMENT | Facility: CLINIC | Age: 73
End: 2018-12-06

## 2019-02-04 DIAGNOSIS — I10 BENIGN HYPERTENSION: ICD-10-CM

## 2019-02-05 NOTE — TELEPHONE ENCOUNTER
"Pending Prescriptions:                       Disp   Refills    potassium chloride ER (K-DUR/KLOR-CON M) *90 tab*0            Sig: TAKE 1 TABLET DAILY    Last Written Prescription Date:  11/6/18  Last Fill Quantity: 90,  # refills: 0   Last office visit: 3/8/2018 with prescribing provider:     Future Office Visit:    Requested Prescriptions   Pending Prescriptions Disp Refills     potassium chloride ER (K-DUR/KLOR-CON M) 20 MEQ CR tablet [Pharmacy Med Name: POT CHLOR ER TABS 20MEQ] 90 tablet 0     Sig: TAKE 1 TABLET DAILY    Potassium Supplements Protocol Passed - 2/4/2019 11:25 PM       Passed - Recent (12 mo) or future (30 days) visit within the authorizing provider's specialty    Patient had office visit in the last 12 months or has a visit in the next 30 days with authorizing provider or within the authorizing provider's specialty.  See \"Patient Info\" tab in inbasket, or \"Choose Columns\" in Meds & Orders section of the refill encounter.             Passed - Medication is active on med list       Passed - Patient is age 18 or older       Passed - Normal serum potassium in past 12 months    Recent Labs   Lab Test 03/08/18  1018   POTASSIUM 4.0                      "

## 2019-02-06 RX ORDER — POTASSIUM CHLORIDE 1500 MG/1
TABLET, EXTENDED RELEASE ORAL
Qty: 30 TABLET | Refills: 0 | Status: SHIPPED | OUTPATIENT
Start: 2019-02-06 | End: 2019-03-21

## 2019-02-06 NOTE — TELEPHONE ENCOUNTER
Pt is due for annual OV. Refilled #30 with note to pharmacy to inform patient to schedule an appointment     Jay LEPE RN

## 2019-02-10 DIAGNOSIS — I10 HTN (HYPERTENSION), BENIGN: ICD-10-CM

## 2019-02-11 NOTE — TELEPHONE ENCOUNTER
"hydrochlorothiazide (HYDRODIURIL) 25 MG tablet  Last Written Prescription Date:  3/8/18  Last Fill Quantity: 90,  # refills: 3   Last office visit: 3/8/2018 with prescribing provider:  kortney Barth Office Visit:        metoprolol succinate (TOPROL-XL) 100 MG 24 hr tablet  Last Written Prescription Date:  3/8/18  Last Fill Quantity: 90,  # refills: 3   Last office visit: 3/8/2018 with prescribing provider:  kortney Barth Office Visit:              Requested Prescriptions   Pending Prescriptions Disp Refills     hydrochlorothiazide (HYDRODIURIL) 25 MG tablet [Pharmacy Med Name: HYDROCHLOROTHIAZIDE TAB 25MG] 90 tablet 3     Sig: TAKE 1 TABLET DAILY    Diuretics (Including Combos) Protocol Failed - 2/10/2019 11:27 PM       Failed - Blood pressure under 140/90 in past 12 months    BP Readings from Last 3 Encounters:   03/08/18 146/80   02/16/17 (!) 166/92   07/08/16 146/86                Passed - Recent (12 mo) or future (30 days) visit within the authorizing provider's specialty    Patient had office visit in the last 12 months or has a visit in the next 30 days with authorizing provider or within the authorizing provider's specialty.  See \"Patient Info\" tab in inbasket, or \"Choose Columns\" in Meds & Orders section of the refill encounter.             Passed - Medication is active on med list       Passed - Patient is age 18 or older       Passed - No active pregancy on record       Passed - Normal serum creatinine on file in past 12 months    Recent Labs   Lab Test 03/08/18  1018   CR 0.67             Passed - Normal serum potassium on file in past 12 months    Recent Labs   Lab Test 03/08/18  1018   POTASSIUM 4.0                   Passed - Normal serum sodium on file in past 12 months    Recent Labs   Lab Test 03/08/18  1018                Passed - No positive pregnancy test in past 12 months        metoprolol succinate ER (TOPROL-XL) 100 MG 24 hr tablet [Pharmacy Med Name: METOPROLOL SUCCINATE ER TABS 100MG] 90 " "tablet 3     Sig: TAKE 1 TABLET DAILY    Beta-Blockers Protocol Failed - 2/10/2019 11:27 PM       Failed - Blood pressure under 140/90 in past 12 months    BP Readings from Last 3 Encounters:   03/08/18 146/80   02/16/17 (!) 166/92   07/08/16 146/86                Passed - Patient is age 6 or older       Passed - Recent (12 mo) or future (30 days) visit within the authorizing provider's specialty    Patient had office visit in the last 12 months or has a visit in the next 30 days with authorizing provider or within the authorizing provider's specialty.  See \"Patient Info\" tab in inbasket, or \"Choose Columns\" in Meds & Orders section of the refill encounter.             Passed - Medication is active on med list          "

## 2019-02-12 RX ORDER — HYDROCHLOROTHIAZIDE 25 MG/1
25 TABLET ORAL DAILY
Qty: 30 TABLET | Refills: 0 | Status: SHIPPED | OUTPATIENT
Start: 2019-02-12 | End: 2019-03-21

## 2019-02-12 RX ORDER — METOPROLOL SUCCINATE 100 MG/1
100 TABLET, EXTENDED RELEASE ORAL DAILY
Qty: 30 TABLET | Refills: 0 | Status: SHIPPED | OUTPATIENT
Start: 2019-02-12 | End: 2019-03-21

## 2019-02-12 NOTE — TELEPHONE ENCOUNTER
Medication is being filled for 1 time refill only due to:  Patient needs to be seen because:  Due for annual exam 3/2019  Last BP elevated     Radha AL RN

## 2019-03-18 ASSESSMENT — ACTIVITIES OF DAILY LIVING (ADL): CURRENT_FUNCTION: NO ASSISTANCE NEEDED

## 2019-03-20 NOTE — PATIENT INSTRUCTIONS
Preventive Health Recommendations    See your health care provider every year to    Review health changes.     Discuss preventive care.      Review your medicines if your doctor has prescribed any.      You no longer need a yearly Pap test unless you've had an abnormal Pap test in the past 10 years. If you have vaginal symptoms, such as bleeding or discharge, be sure to talk with your provider about a Pap test.      Every 1 to 2 years, have a mammogram.  If you are over 69, talk with your health care provider about whether or not you want to continue having screening mammograms.      Every 10 years, have a colonoscopy. Or, have a yearly FIT test (stool test). These exams will check for colon cancer.       Have a cholesterol test every 5 years, or more often if your doctor advises it.       Have a diabetes test (fasting glucose) every three years. If you are at risk for diabetes, you should have this test more often.       At age 65, have a bone density scan (DEXA) to check for osteoporosis (brittle bone disease).    Shots:    Get a flu shot each year.    Get a tetanus shot every 10 years.    Talk to your doctor about your pneumonia vaccines. There are now two you should receive - Pneumovax (PPSV 23) and Prevnar (PCV 13).    Talk to your pharmacist about the shingles vaccine.    Talk to your doctor about the hepatitis B vaccine.    Nutrition:     Eat at least 5 servings of fruits and vegetables each day.      Eat whole-grain bread, whole-wheat pasta and brown rice instead of white grains and rice.      Get adequate Calcium and Vitamin D.     Lifestyle    Exercise at least 150 minutes a week (30 minutes a day, 5 days a week). This will help you control your weight and prevent disease.      Limit alcohol to one drink per day.      No smoking.       Wear sunscreen to prevent skin cancer.       See your dentist twice a year for an exam and cleaning.      See your eye doctor every 1 to 2 years to screen for conditions  such as glaucoma, macular degeneration and cataracts.    Personalized Prevention Plan  You are due for the preventive services outlined below.  Your care team is available to assist you in scheduling these services.  If you have already completed any of these items, please share that information with your care team to update in your medical record.  Health Maintenance Due   Topic Date Due     Hepatitis C Screening  09/10/1963     Zoster (Shingles) Vaccine (2 of 3) 08/04/2009     Discuss Advance Directive Planning  08/02/2017     Flu Vaccine (1) 09/01/2018     Annual Wellness Visit  03/08/2019     FALL RISK ASSESSMENT  03/08/2019       Shingrex is the new shingles shot; check with insurance.  Bone density

## 2019-03-20 NOTE — PROGRESS NOTES
"  Answers for HPI/ROS submitted by the patient on 3/18/2019   Annual Exam:  In general, how would you rate your overall physical health?: good  Frequency of exercise:: 2-3 days/week  Do you usually eat at least 4 servings of fruit and vegetables a day, include whole grains & fiber, and avoid regularly eating high fat or \"junk\" foods? : Yes  Taking medications regularly:: Yes  Medication side effects:: None  Activities of Daily Living: no assistance needed  Home safety: no safety concerns identified  Hearing Impairment:: no hearing concerns  In the past 6 months, have you been bothered by leaking of urine?: Yes  In general, how would you rate your overall mental or emotional health?: good  Additional concerns today:: No  Duration of exercise:: 15-30 minutes    "

## 2019-03-21 ENCOUNTER — OFFICE VISIT (OUTPATIENT)
Dept: FAMILY MEDICINE | Facility: CLINIC | Age: 74
End: 2019-03-21
Payer: MEDICARE

## 2019-03-21 VITALS
WEIGHT: 190 LBS | HEIGHT: 66 IN | DIASTOLIC BLOOD PRESSURE: 86 MMHG | HEART RATE: 68 BPM | TEMPERATURE: 99.5 F | BODY MASS INDEX: 30.53 KG/M2 | SYSTOLIC BLOOD PRESSURE: 167 MMHG | OXYGEN SATURATION: 95 %

## 2019-03-21 DIAGNOSIS — R73.9 HYPERGLYCEMIA: ICD-10-CM

## 2019-03-21 DIAGNOSIS — Z00.00 MEDICARE ANNUAL WELLNESS VISIT, SUBSEQUENT: Primary | ICD-10-CM

## 2019-03-21 DIAGNOSIS — I47.10 PSVT (PAROXYSMAL SUPRAVENTRICULAR TACHYCARDIA) (H): ICD-10-CM

## 2019-03-21 DIAGNOSIS — I10 HTN (HYPERTENSION), BENIGN: ICD-10-CM

## 2019-03-21 DIAGNOSIS — E11.9 TYPE 2 DIABETES MELLITUS WITHOUT COMPLICATION, WITHOUT LONG-TERM CURRENT USE OF INSULIN (H): ICD-10-CM

## 2019-03-21 DIAGNOSIS — E78.5 HYPERLIPIDEMIA WITH TARGET LDL LESS THAN 100: ICD-10-CM

## 2019-03-21 DIAGNOSIS — Z11.59 NEED FOR HEPATITIS C SCREENING TEST: ICD-10-CM

## 2019-03-21 LAB
ALBUMIN SERPL-MCNC: 4.3 G/DL (ref 3.4–5)
ALP SERPL-CCNC: 64 U/L (ref 40–150)
ALT SERPL W P-5'-P-CCNC: 76 U/L (ref 0–50)
ANION GAP SERPL CALCULATED.3IONS-SCNC: 8 MMOL/L (ref 3–14)
AST SERPL W P-5'-P-CCNC: 54 U/L (ref 0–45)
BILIRUB SERPL-MCNC: 1.3 MG/DL (ref 0.2–1.3)
BUN SERPL-MCNC: 13 MG/DL (ref 7–30)
CALCIUM SERPL-MCNC: 9.1 MG/DL (ref 8.5–10.1)
CHLORIDE SERPL-SCNC: 103 MMOL/L (ref 94–109)
CHOLEST SERPL-MCNC: 204 MG/DL
CO2 SERPL-SCNC: 26 MMOL/L (ref 20–32)
CREAT SERPL-MCNC: 0.64 MG/DL (ref 0.52–1.04)
ERYTHROCYTE [DISTWIDTH] IN BLOOD BY AUTOMATED COUNT: 12.5 % (ref 10–15)
GFR SERPL CREATININE-BSD FRML MDRD: 89 ML/MIN/{1.73_M2}
GLUCOSE SERPL-MCNC: 149 MG/DL (ref 70–99)
HBA1C MFR BLD: 6.7 % (ref 0–5.6)
HCT VFR BLD AUTO: 42.8 % (ref 35–47)
HDLC SERPL-MCNC: 33 MG/DL
HGB BLD-MCNC: 15 G/DL (ref 11.7–15.7)
LDLC SERPL CALC-MCNC: 137 MG/DL
MCH RBC QN AUTO: 34.3 PG (ref 26.5–33)
MCHC RBC AUTO-ENTMCNC: 35 G/DL (ref 31.5–36.5)
MCV RBC AUTO: 98 FL (ref 78–100)
NONHDLC SERPL-MCNC: 171 MG/DL
PLATELET # BLD AUTO: 192 10E9/L (ref 150–450)
POTASSIUM SERPL-SCNC: 3.8 MMOL/L (ref 3.4–5.3)
PROT SERPL-MCNC: 7.6 G/DL (ref 6.8–8.8)
RBC # BLD AUTO: 4.37 10E12/L (ref 3.8–5.2)
SODIUM SERPL-SCNC: 137 MMOL/L (ref 133–144)
TRIGL SERPL-MCNC: 172 MG/DL
WBC # BLD AUTO: 5.7 10E9/L (ref 4–11)

## 2019-03-21 PROCEDURE — 83036 HEMOGLOBIN GLYCOSYLATED A1C: CPT | Performed by: PHYSICIAN ASSISTANT

## 2019-03-21 PROCEDURE — G0439 PPPS, SUBSEQ VISIT: HCPCS | Performed by: PHYSICIAN ASSISTANT

## 2019-03-21 PROCEDURE — 80053 COMPREHEN METABOLIC PANEL: CPT | Performed by: PHYSICIAN ASSISTANT

## 2019-03-21 PROCEDURE — 36415 COLL VENOUS BLD VENIPUNCTURE: CPT | Performed by: PHYSICIAN ASSISTANT

## 2019-03-21 PROCEDURE — 85027 COMPLETE CBC AUTOMATED: CPT | Performed by: PHYSICIAN ASSISTANT

## 2019-03-21 PROCEDURE — G0472 HEP C SCREEN HIGH RISK/OTHER: HCPCS | Performed by: PHYSICIAN ASSISTANT

## 2019-03-21 PROCEDURE — 80061 LIPID PANEL: CPT | Performed by: PHYSICIAN ASSISTANT

## 2019-03-21 RX ORDER — HYDROCHLOROTHIAZIDE 25 MG/1
25 TABLET ORAL DAILY
Qty: 90 TABLET | Refills: 3 | Status: SHIPPED | OUTPATIENT
Start: 2019-03-21 | End: 2020-02-12

## 2019-03-21 RX ORDER — IRBESARTAN 150 MG/1
150 TABLET ORAL AT BEDTIME
Qty: 30 TABLET | Refills: 1 | Status: SHIPPED | OUTPATIENT
Start: 2019-03-21 | End: 2019-03-21

## 2019-03-21 RX ORDER — POTASSIUM CHLORIDE 1500 MG/1
20 TABLET, EXTENDED RELEASE ORAL DAILY
Qty: 90 TABLET | Refills: 3 | Status: SHIPPED | OUTPATIENT
Start: 2019-03-21 | End: 2020-02-12

## 2019-03-21 RX ORDER — IRBESARTAN 150 MG/1
150 TABLET ORAL AT BEDTIME
Qty: 30 TABLET | Refills: 1 | Status: SHIPPED | OUTPATIENT
Start: 2019-03-21 | End: 2019-04-23

## 2019-03-21 RX ORDER — METOPROLOL SUCCINATE 100 MG/1
100 TABLET, EXTENDED RELEASE ORAL DAILY
Qty: 90 TABLET | Refills: 3 | Status: SHIPPED | OUTPATIENT
Start: 2019-03-21 | End: 2020-02-12

## 2019-03-21 ASSESSMENT — MIFFLIN-ST. JEOR: SCORE: 1388.58

## 2019-03-21 ASSESSMENT — ACTIVITIES OF DAILY LIVING (ADL): CURRENT_FUNCTION: NO ASSISTANCE NEEDED

## 2019-03-21 NOTE — PROGRESS NOTES
"SUBJECTIVE:   Carmel Nazario is a 73 year old female who presents for Preventive Visit.    She has gained some weight since she was in [place] for a month  She had bilat RODRÍGUEZ by Warren Garnica and feels great with no pain  Declines dexa  Home BPs running around 130-140/88; comes down on recheck  Are you in the first 12 months of your Medicare coverage?  No    Annual Wellness Visit     In general, how would you rate your overall health?  Good    Frequency of exercise:  2-3 days/week    Duration of exercise:  15-30 minutes    Do you usually eat at least 4 servings of fruit and vegetables a day, include whole grains    & fiber and avoid regularly eating high fat or \"junk\" foods?  Yes    Taking medications regularly:  Yes    Medication side effects:  None    Ability to successfully perform activities of daily living:  No assistance needed    Home Safety:  No safety concerns identified    Hearing Impairment:  No hearing concerns    In the past 6 months, have you been bothered by leaking of urine? Yes    In general, how would you rate your overall mental or emotional health?  Good    PHQ-2 Total Score: 0    Additional concerns today:  No    Do you feel safe in your environment? Yes    Do you have a Health Care Directive? Yes: Advance Directive has been received and scanned.      Cognitive Screening   1) Repeat 3 items (Leader, Season, Table)    2) Clock draw: NORMAL  3) 3 item recall: Recalls 2 objects   Results: NORMAL clock, 1-2 items recalled: COGNITIVE IMPAIRMENT LESS LIKELY    Mini-CogTM Copyright SHIRLEY Bonner. Licensed by the author for use in [facility]; reprinted with permission (carolynn@.[facility]). All rights reserved.      Do you have sleep apnea, excessive snoring or daytime drowsiness?: no    Reviewed and updated as needed this visit by clinical staff  Tobacco  Allergies  Meds  Med Hx  Soc Hx        Reviewed and updated as needed this visit by Provider  Allergies  Med Hx        Social History     Tobacco Use "     Smoking status: Former Smoker     Packs/day: 1.00     Years: 20.00     Pack years: 20.00     Types: Cigarettes     Start date: 1968     Last attempt to quit: 1987     Years since quittin.8     Smokeless tobacco: Never Used   Substance Use Topics     Alcohol use: Yes     Alcohol/week: 0.0 oz     Comment: Occasional       Alcohol Use 3/18/2019   If you drink alcohol do you typically have greater than 3 drinks per day OR greater than 7 drinks per week? No       Current providers sharing in care for this patient include:   Patient Care Team:  Rimma Casarez PA-C as PCP - General (Internal Medicine)  Rimma Casarez PA-C as Assigned PCP    The following health maintenance items are reviewed in Epic and correct as of today:  Health Maintenance   Topic Date Due     FOOT EXAM Q1 YEAR  09/10/1946     EYE EXAM Q1 YEAR  09/10/1946     HEPATITIS C SCREENING  09/10/1964     ZOSTER IMMUNIZATION (2 of 3) 2009     MICROALBUMIN Q1 YEAR  2013     TSH W/ FREE T4 REFLEX Q2 YEAR  2014     ADVANCE DIRECTIVE PLANNING Q5 YRS  2017     INFLUENZA VACCINE (1) 2018     A1C Q6 MO  2018     CREATININE Q1 YEAR  2019     LIPID MONITORING Q1 YEAR  2019     MEDICARE ANNUAL WELLNESS VISIT  2019     FALL RISK ASSESSMENT  2019     PHQ-2 Q1 YR  2020     MAMMO SCREEN Q2 YR (SYSTEM ASSIGNED)  2020     COLONOSCOPY Q5 YR  2022     DTAP/TDAP/TD IMMUNIZATION (2 - Td) 2022     DEXA SCAN SCREENING (SYSTEM ASSIGNED)  Completed     IPV IMMUNIZATION  Aged Out     MENINGITIS IMMUNIZATION  Aged Out       Past Medical History:   Diagnosis Date     Anxiety      Colon polyp     colonoscopy 2006     Diabetes mellitus (H)      Fatty liver disease, nonalcoholic      Hip bursitis      HTN (hypertension), benign      Hyperlipidemia LDL goal < 100      OA (osteoarthritis) of hip     Dr. Garnica     Osteopenia     tried fosamax, but needed bone graft to jaw     PSVT  "(paroxysmal supraventricular tachycardia) (H)      Renal lithiasis      Skin cancer     BCC s/p Mohs     Type 2 diabetes, HbA1c goal < 7% (H)      Past Surgical History:   Procedure Laterality Date     BIOPSY  2014    Cheek     C TOTAL HIP ARTHROPLASTY  5-2014    R     COLONOSCOPY  2017    Polyp     corrective eye surgeries       GYN SURGERY  1985    Tubes tied     LAPAROSCOPIC CHOLECYSTECTOMY       Current Outpatient Medications   Medication Sig Dispense Refill     hydrochlorothiazide (HYDRODIURIL) 25 MG tablet Take 1 tablet (25 mg) by mouth daily 90 tablet 3     irbesartan (AVAPRO) 150 MG tablet Take 1 tablet (150 mg) by mouth At Bedtime 30 tablet 1     latanoprost (XALATAN) 0.005 % ophthalmic solution 1 drop At Bedtime.       metoprolol succinate ER (TOPROL-XL) 100 MG 24 hr tablet Take 1 tablet (100 mg) by mouth daily 90 tablet 3     potassium chloride ER (K-DUR/KLOR-CON M) 20 MEQ CR tablet Take 1 tablet (20 mEq) by mouth daily 90 tablet 3       Review of Systems  Constitutional, HEENT, cardiovascular, pulmonary, GI, , musculoskeletal, neuro, skin, endocrine and psych systems are negative, except as otherwise noted.    OBJECTIVE:   /86 (BP Location: Right arm, Patient Position: Chair, Cuff Size: Adult Large)   Pulse 68   Temp 99.5  F (37.5  C) (Tympanic)   Ht 1.676 m (5' 6\")   Wt 86.2 kg (190 lb)   SpO2 95%   BMI 30.67 kg/m   Estimated body mass index is 30.67 kg/m  as calculated from the following:    Height as of this encounter: 1.676 m (5' 6\").    Weight as of this encounter: 86.2 kg (190 lb).  Physical Exam  GENERAL APPEARANCE: healthy, alert and no distress  EYES: Eyes grossly normal to inspection, PERRL and conjunctivae and sclerae normal  HENT: ear canals and TM's normal, nose and mouth without ulcers or lesions, oropharynx clear and oral mucous membranes moist  NECK: no adenopathy, no asymmetry, masses, or scars and thyroid normal to palpation  RESP: lungs clear to auscultation - no rales, " rhonchi or wheezes  BREAST: normal without masses, tenderness or nipple discharge and no palpable axillary masses or adenopathy  CV: regular rate and rhythm, normal S1 S2, no S3 or S4, no murmur, click or rub, no peripheral edema and peripheral pulses strong  ABDOMEN: soft, nontender, no hepatosplenomegaly, no masses and bowel sounds normal  MS: no musculoskeletal defects are noted and gait is age appropriate without ataxia  SKIN: no suspicious lesions or rashes  NEURO: Normal strength and tone, sensory exam grossly normal, mentation intact and speech normal  PSYCH: mentation appears normal and affect normal/bright    Results for orders placed or performed in visit on 03/21/19   CBC with platelets   Result Value Ref Range    WBC 5.7 4.0 - 11.0 10e9/L    RBC Count 4.37 3.8 - 5.2 10e12/L    Hemoglobin 15.0 11.7 - 15.7 g/dL    Hematocrit 42.8 35.0 - 47.0 %    MCV 98 78 - 100 fl    MCH 34.3 (H) 26.5 - 33.0 pg    MCHC 35.0 31.5 - 36.5 g/dL    RDW 12.5 10.0 - 15.0 %    Platelet Count 192 150 - 450 10e9/L   Hemoglobin A1c   Result Value Ref Range    Hemoglobin A1C 6.7 (H) 0 - 5.6 %         ASSESSMENT / PLAN:   Assessment and Plan:     (Z00.00) Medicare annual wellness visit, subsequent  (primary encounter diagnosis)  Comment: discussed shingrex.  Mammogram and colonoscopy up to date. Declines DXA  Plan: CBC with platelets            (E11.9) Type 2 diabetes mellitus without complication, without long-term current use of insulin (H)  Comment:   Plan: newly diagnosed today. Recd she start Wt Watchers and will discuss treatment options at OV one month that is scheduled for HTN f/u.    (I10) HTN (hypertension), benign  Comment:   Plan: hydrochlorothiazide (HYDRODIURIL) 25 MG tablet,        Comprehensive metabolic panel, metoprolol         succinate ER (TOPROL-XL) 100 MG 24 hr tablet,         potassium chloride ER (K-DUR/KLOR-CON M) 20 MEQ        CR tablet, ADD irbesartan (AVAPRO) 150 MG tablet, f/u one month with BP readings.    "       (R73.9) Hyperglycemia  Comment:   Plan: Hemoglobin A1c            (E78.5) Hyperlipidemia with target LDL less than 100  Comment:   Plan: Lipid Profile            (I47.1) PSVT (paroxysmal supraventricular tachycardia) (H)  Comment:   Plan: refilled toprol    (Z11.59) Need for hepatitis C screening test  Comment:   Plan: Hepatitis C Screen Reflex to HCV RNA Quant and         Genotype                End of Life Planning:  Patient currently has an advanced directive: No.  I have verified the patient's ablity to prepare an advanced directive/make health care decisions.  Literature was provided to assist patient in preparing an advanced directive.    COUNSELING:  Reviewed preventive health counseling, as reflected in patient instructions    BP Readings from Last 1 Encounters:   03/21/19 167/86     Estimated body mass index is 30.67 kg/m  as calculated from the following:    Height as of this encounter: 1.676 m (5' 6\").    Weight as of this encounter: 86.2 kg (190 lb).           reports that she quit smoking about 31 years ago. Her smoking use included cigarettes. She started smoking about 50 years ago. She has a 20.00 pack-year smoking history. she has never used smokeless tobacco.      Appropriate preventive services were discussed with this patient, including applicable screening as appropriate for cardiovascular disease, diabetes, osteopenia/osteoporosis, and glaucoma.  As appropriate for age/gender, discussed screening for colorectal cancer, prostate cancer, breast cancer, and cervical cancer. Checklist reviewing preventive services available has been given to the patient.    Reviewed patients plan of care and provided an AVS. The Basic Care Plan (routine screening as documented in Health Maintenance) for Carmel meets the Care Plan requirement. This Care Plan has been established and reviewed with the Patient.    Counseling Resources:  ATP IV Guidelines  Pooled Cohorts Equation Calculator  Breast Cancer Risk " Calculator  FRAX Risk Assessment  ICSI Preventive Guidelines  Dietary Guidelines for Americans, 2010  USDA's MyPlate  ASA Prophylaxis  Lung CA Screening    Rimma Casarez PA-C  [facility]

## 2019-03-22 LAB — HCV AB SERPL QL IA: NONREACTIVE

## 2019-03-23 NOTE — RESULT ENCOUNTER NOTE
Carmel,    Your screening test for hepatitis C is negataive.  Your blood sugar is elevated to 149.  That, in combination with a hemaglobin A1c of 6.7% means you have type 2 diabetes.  I would recommend you go back on weight watchers and we can talk about medication for this at your follow up appointment next month.  I would also like you to see our diabetic educator before then so someone should be giving you a call to set that up.    Your white count, hemoglobin and platelets were normal.  Your cholesterol is high and when you have diabetes we want the LDL (bad cholesterol) <100 so we will have to talk about medication for that as well.  Your liver functions continue to be elevated due to fatty liver disease and weight loss will help with that as well.    See you next month.    Rimma Casarez PA-C

## 2019-04-23 ENCOUNTER — OFFICE VISIT (OUTPATIENT)
Dept: FAMILY MEDICINE | Facility: CLINIC | Age: 74
End: 2019-04-23
Payer: MEDICARE

## 2019-04-23 VITALS
BODY MASS INDEX: 30.37 KG/M2 | DIASTOLIC BLOOD PRESSURE: 88 MMHG | SYSTOLIC BLOOD PRESSURE: 149 MMHG | OXYGEN SATURATION: 98 % | HEART RATE: 64 BPM | TEMPERATURE: 97.4 F | HEIGHT: 66 IN | WEIGHT: 189 LBS

## 2019-04-23 DIAGNOSIS — I10 HTN (HYPERTENSION), BENIGN: Primary | ICD-10-CM

## 2019-04-23 DIAGNOSIS — E11.9 TYPE 2 DIABETES MELLITUS WITHOUT COMPLICATION, WITHOUT LONG-TERM CURRENT USE OF INSULIN (H): ICD-10-CM

## 2019-04-23 LAB
CREAT UR-MCNC: 79 MG/DL
MICROALBUMIN UR-MCNC: 18 MG/L
MICROALBUMIN/CREAT UR: 22.91 MG/G CR (ref 0–25)
POTASSIUM SERPL-SCNC: 4 MMOL/L (ref 3.4–5.3)
TSH SERPL DL<=0.005 MIU/L-ACNC: 2.86 MU/L (ref 0.4–4)

## 2019-04-23 PROCEDURE — 84132 ASSAY OF SERUM POTASSIUM: CPT | Performed by: PHYSICIAN ASSISTANT

## 2019-04-23 PROCEDURE — 84443 ASSAY THYROID STIM HORMONE: CPT | Performed by: PHYSICIAN ASSISTANT

## 2019-04-23 PROCEDURE — 82043 UR ALBUMIN QUANTITATIVE: CPT | Performed by: PHYSICIAN ASSISTANT

## 2019-04-23 PROCEDURE — 99214 OFFICE O/P EST MOD 30 MIN: CPT | Performed by: PHYSICIAN ASSISTANT

## 2019-04-23 PROCEDURE — 36415 COLL VENOUS BLD VENIPUNCTURE: CPT | Performed by: PHYSICIAN ASSISTANT

## 2019-04-23 RX ORDER — IRBESARTAN 150 MG/1
150 TABLET ORAL AT BEDTIME
Qty: 90 TABLET | Refills: 1 | Status: SHIPPED | OUTPATIENT
Start: 2019-04-23 | End: 2019-09-25

## 2019-04-23 ASSESSMENT — MIFFLIN-ST. JEOR: SCORE: 1384.05

## 2019-04-23 NOTE — PROGRESS NOTES
HPI: 74 yo female here for f/u HTN   At her px last month we added avapro 150mg every day.  She is also taking toprol xl 100mg and hydrochlorothiazide 25mg every day  She was getting excellent readings at home but when she brings her home monitor in for comparison it is giving lower readings than what we are getting here  Her  was found to have a flutter and had ablation so she has stress from that.  She had leg cramps when sitting her her  in the hospital    She was also diagnosed with type 2 DM at Px  She did get a call regarding diab ed but not interested in doing this  She will try Wt Watchers again  She has her eye exam schedule for July.  Denies any numbness in her toes.    Lab Results   Component Value Date    A1C 6.7 2019    A1C 5.9 2018    A1C 5.9 2015    A1C 5.8 2013    A1C 5.5 2012         Past Medical History:   Diagnosis Date     Anxiety      Colon polyp     colonoscopy 2006     Diabetes mellitus (H)      Fatty liver disease, nonalcoholic      Hip bursitis      HTN (hypertension), benign      Hyperlipidemia LDL goal < 100      OA (osteoarthritis) of hip     Dr. Garnica     Osteopenia     tried fosamax, but needed bone graft to jaw     PSVT (paroxysmal supraventricular tachycardia) (H)      Renal lithiasis      Skin cancer     BCC s/p Mohs     Type 2 diabetes, HbA1c goal < 7% (H)      Past Surgical History:   Procedure Laterality Date     BIOPSY  2014    Cheek     C TOTAL HIP ARTHROPLASTY  -    R     COLONOSCOPY  2017    Polyp     corrective eye surgeries       GYN SURGERY  1985    Tubes tied     LAPAROSCOPIC CHOLECYSTECTOMY       Social History     Tobacco Use     Smoking status: Former Smoker     Packs/day: 1.00     Years: 20.00     Pack years: 20.00     Types: Cigarettes     Start date: 1968     Last attempt to quit: 1987     Years since quittin.9     Smokeless tobacco: Never Used   Substance Use Topics     Alcohol use: Yes      "Alcohol/week: 0.0 oz     Comment: Occasional     Current Outpatient Medications   Medication Sig Dispense Refill     hydrochlorothiazide (HYDRODIURIL) 25 MG tablet Take 1 tablet (25 mg) by mouth daily 90 tablet 3     irbesartan (AVAPRO) 150 MG tablet Take 1 tablet (150 mg) by mouth At Bedtime 30 tablet 1     latanoprost (XALATAN) 0.005 % ophthalmic solution 1 drop At Bedtime.       metoprolol succinate ER (TOPROL-XL) 100 MG 24 hr tablet Take 1 tablet (100 mg) by mouth daily 90 tablet 3     potassium chloride ER (K-DUR/KLOR-CON M) 20 MEQ CR tablet Take 1 tablet (20 mEq) by mouth daily 90 tablet 3     Allergies   Allergen Reactions     Fosamax [Alendronate Sodium]      Jaw problems     Penicillins      rash     Sulfa Drugs      GI upset     FAMILY HISTORY NOTED AND REVIEWED      PHYSICAL EXAM:    /88 (BP Location: Right arm, Patient Position: Sitting, Cuff Size: Adult Large)   Pulse 64   Temp 97.4  F (36.3  C) (Oral)   Ht 1.676 m (5' 6\")   Wt 85.7 kg (189 lb)   SpO2 98%   BMI 30.51 kg/m      Patient appears non toxic  Checked BP on her home monitor in office and got 167/102 and then 159/94  She is not getting high readings like that at home so suspect white coat HTN    Assessment and Plan:     (I10) HTN (hypertension), benign  (primary encounter diagnosis)  Comment: get a different BP monitor and continue to follow. Cont same for now  Plan: Potassium, irbesartan (AVAPRO) 150 MG tablet            (E11.9) Type 2 diabetes mellitus without complication, without long-term current use of insulin (H)  Comment: she wants to work on lifestyle modification before meeting with diabetic ed. She admits her diet was bad over the winter and that she is cleaning it up  Plan: TSH, Albumin Random Urine Quantitative with         Creat Ratio              Rimma Casarez PA-C        "

## 2019-04-26 NOTE — RESULT ENCOUNTER NOTE
Carmel,    Your urine microalbumin, thyroid test and potassium levels were all normal.    Rimma Casarez PA-C

## 2019-07-01 ENCOUNTER — TRANSFERRED RECORDS (OUTPATIENT)
Dept: HEALTH INFORMATION MANAGEMENT | Facility: CLINIC | Age: 74
End: 2019-07-01

## 2019-07-01 LAB — RETINOPATHY: NORMAL

## 2019-07-14 ENCOUNTER — TELEPHONE (OUTPATIENT)
Dept: NURSING | Facility: CLINIC | Age: 74
End: 2019-07-14

## 2019-07-14 ENCOUNTER — MYC MEDICAL ADVICE (OUTPATIENT)
Dept: FAMILY MEDICINE | Facility: CLINIC | Age: 74
End: 2019-07-14

## 2019-07-14 DIAGNOSIS — J01.90 ACUTE SINUSITIS WITH SYMPTOMS > 10 DAYS: Primary | ICD-10-CM

## 2019-07-14 NOTE — TELEPHONE ENCOUNTER
Patient calling to see if she can get an antibiotic prescribed for a sinus infection. She saw a provider in [place] this week who did not prescribe an antibiotic. I advised that she would need to be seen for this. Patient thinks she will try to contact Rimma Casarez in the clinic tomorrow because she knows her history. No other questions.  Nelly Zavala RN  [facility]

## 2019-07-15 RX ORDER — AZITHROMYCIN 250 MG/1
TABLET, FILM COATED ORAL
Qty: 6 TABLET | Refills: 0 | Status: SHIPPED | OUTPATIENT
Start: 2019-07-15 | End: 2020-01-08

## 2019-07-15 NOTE — TELEPHONE ENCOUNTER
PCP,    Pt went to  on 7/10 for dizziness/ vertigo. Would you suggest [facility] center?    Thank you,  Jay LEPE RN

## 2019-07-15 NOTE — TELEPHONE ENCOUNTER
She feels this related to sinuses so I sent [facility] to her pharm in [facility] if you could let her know.

## 2019-07-15 NOTE — TELEPHONE ENCOUNTER
TO PCP (see MyChart message also)    Spoke with pt     Pt went to [facility]     Fluid seen behind ear drum     Advised Dramamine - wipes her out     Sent a [facility] message too - was told it was a sinus infection, pt told that provider she is prone to sinus infection    She was seen last Wed     He didn't think Avapro was causing sx    Fever? No     Drainage? Right side (nose blocked) plugged, turning over in bed she 'spins'     Sinus headache - no redness     Radha AL RN

## 2019-07-24 ENCOUNTER — HOSPITAL ENCOUNTER (OUTPATIENT)
Dept: PHYSICAL THERAPY | Facility: CLINIC | Age: 74
Setting detail: THERAPIES SERIES
End: 2019-07-24
Attending: PHYSICIAN ASSISTANT
Payer: MEDICARE

## 2019-07-24 ENCOUNTER — TELEPHONE (OUTPATIENT)
Dept: FAMILY MEDICINE | Facility: CLINIC | Age: 74
End: 2019-07-24

## 2019-07-24 DIAGNOSIS — H81.10 BENIGN PAROXYSMAL POSITIONAL VERTIGO, UNSPECIFIED LATERALITY: ICD-10-CM

## 2019-07-24 DIAGNOSIS — H81.10 BENIGN PAROXYSMAL POSITIONAL VERTIGO, UNSPECIFIED LATERALITY: Primary | ICD-10-CM

## 2019-07-24 PROCEDURE — 97535 SELF CARE MNGMENT TRAINING: CPT | Mod: GP | Performed by: PHYSICAL THERAPIST

## 2019-07-24 PROCEDURE — 95992 CANALITH REPOSITIONING PROC: CPT | Mod: GP | Performed by: PHYSICAL THERAPIST

## 2019-07-24 PROCEDURE — 97162 PT EVAL MOD COMPLEX 30 MIN: CPT | Mod: GP | Performed by: PHYSICAL THERAPIST

## 2019-07-24 NOTE — TELEPHONE ENCOUNTER
Pt continues to have positional vertigo x 3 weeks.  Tried antibiotics and dramamine without relief  Feels miserable  I reviewed notes from other provider  Recd vestibular rehab and order placed  Pt could try meclizine 25mg tid prn.

## 2019-07-24 NOTE — TELEPHONE ENCOUNTER
Called patient as PCP had opening this afternoon    Pt states she was able to get in sooner and is having treatments/therapy this afternoon so she declined visit here today     Pt will be picking meclizine per ENT provider's recommendation    Radha AL RN

## 2019-07-24 NOTE — PROGRESS NOTES
Physical Therapy Evaluation  07/24/19 1300   Quick Adds   Quick Adds Certification;Vestibular Eval   General Information   Start of Care Date 07/24/19   Referring Physician LEOPOLDO Ortez   Orders Evaluate and Treat as Indicated   Order Date 07/24/19   Medical Diagnosis bengin paroxysmal positional vertigo   Onset of illness/injury or Date of Surgery 07/06/19   Precautions/Limitations no known precautions/limitations   Surgical/Medical history reviewed Yes  (hip replacement and gallbladder)   Pertinent history of current problem (include personal factors and/or comorbidities that impact the POC) Patient reports that she has sinus pressure on the right side, clicking in her sinuses, and pressure in her ear in addition to her vertigo.  Patient sounds congested with talking and reports fullness with movement.  Reports the room spins when rolling in bed, has been very slow moving out of fear of increasing her symptoms. Symptoms only lasts about 60 seconds.  Reports today things have changed and she feels dizziness with walking and feels like she is off balance.  Reports dry heaves in the morning and nausea.   Pertinent Visual History  bifocals   Prior level of function comment independent   Previous/Current Treatment Medication(s)   Improvement after medication No   Patient role/Employment history Retired   Living environment [place]   Home/Community Accessibility Comments Flgiht [place]   Assistive Devices Comments none   Patient/Family Goals Statement to decrease dizziness and sinus pressure   Fall Risk Screen   Fall screen completed by PT   Have you fallen 2 or more times in the past year? No   Have you fallen and had an injury in the past year? No   Is patient a fall risk? No   Abuse Screen (yes response referral indicated)   Feels Unsafe at Home or Work/School no   Feels Threatened by Someone no   Does Anyone Try to Keep You From Having Contact with Others or Doing Things Outside Your Home? no   Physical Signs  of Abuse Present no   System Outcome Measures   Outcome Measures BPPV   Dizziness Handicap Inventory (score out of 100) A decrease in score by 17.18 or greater indicates a clinically significant change in symptoms. 92   Pain   Patient currently in pain Yes   Pain location facial   Pain rating 5/10   Pain description Deep;Dull;Pressure   Pain description comment advil didn't change symptoms   Cognitive Status Examination   Orientation orientation to person, place and time   Level of Consciousness alert   Follows Commands and Answers Questions 100% of the time   Personal Safety and Judgment intact   Memory intact   Observation   Observation Patient has reddening to B inferior eye and inferior eye lid   Posture   Posture Forward head position;Protracted shoulders   Palpation   Palpation denies   Range of Motion (ROM)   ROM Quick Adds no deficits were identified   Sensory Examination   Sensory Perception no deficits were identified   Cervicogenic Screen   Neck ROM Rotation B 65*; ext: 70*; flex: 50*   Vertebral Artery Test Normal   Oculomotor Exam   Smooth Pursuit Normal   Saccades Normal   VOR Normal   VOR Cancellation Normal   Convergence Testing Normal   Infrared Goggle Exam or Frenzel Lense Exam   Vestibular Suppressant in Last 24 Hours? No   Exam completed with Room Light   Gaze Evoked Nystagmus Negative   Head Shake Horizontal Nystagmus Negative   Otto-Hallpike (right) Negative   Ana Maria-Hallpike (Left) Upbeating L torsional   HSCC Supine Roll Test (Right) Negative   HSCC Supine Roll Test (Left) Negative   BPPV Canal(s) L Posterior   BPPV Type Canalithasis   Modality Interventions   Planned Modality Interventions Ultrasound;TENS;Electrical Stimulation/Russion Stimulation;Cryotherapy   Planned Therapy Interventions   Planned Therapy Interventions balance training;gait training;joint mobilization;neuromuscular re-education;ROM;strengthening;stretching;manual therapy   Planned Therapy Interventions Comment SCR   Clinical  Impression   Criteria for Skilled Therapeutic Interventions Met yes, treatment indicated   PT Diagnosis L sided posterior canalithasis BPPV   Influenced by the following impairments (+) ana maria hallpike   Functional limitations due to impairments difficulty with ambulation, bed mobility, quick head movements   Clinical Presentation Evolving/Changing   Clinical Presentation Rationale anxiety of symptoms, severity of symptoms, sinus pressure   Clinical Decision Making (Complexity) Moderate complexity   Therapy Frequency 2 times/Week   Predicted Duration of Therapy Intervention (days/wks) 12 weeks   Risk & Benefits of therapy have been explained Yes   Patient, Family & other staff in agreement with plan of care Yes   Education Assessment   Preferred Learning Style Listening   Barriers to Learning No barriers   GOALS   PT Eval Goals 1;2;3   Goal 1   Goal Identifier Ana Maria hallpike   Goal Description Patient will have negative testing with L ana maria hallpike in order to improve bed mobility.   Target Date 09/04/19   Goal 2   Goal Identifier bed mobility   Goal Description Patient will report a 50% improvement in symptoms with bed mobility in order to have increased safety with transfers   Target Date 08/21/19   Goal 3   Goal Identifier DHI   Goal Description Patient will have a 10 point reduction on the DHI in order to have increased safety with ambulation.   Target Date 10/16/19   Total Evaluation Time   PT Eval, Moderate Complexity Minutes (23163) 35   Therapy Certification   Certification date from 07/24/19   Certification date to 10/16/19   Medical Diagnosis Benign Paroxysmal Positional Vertigo   Certification I certify the need for these services furnished under this plan of treatment and while under my care.  (Physician co-signature of this document indicates review and certification of the therapy plan).   Please contact me with any questions or concerns.    Thank you for your referral,     Donna Ramos, PT, DPT,  CLT  Physical Therapist & Certified Lymphedema Therapist  [address] [id number] [phone]

## 2019-07-26 ENCOUNTER — TRANSFERRED RECORDS (OUTPATIENT)
Dept: HEALTH INFORMATION MANAGEMENT | Facility: CLINIC | Age: 74
End: 2019-07-26

## 2019-07-30 NOTE — ADDENDUM NOTE
Encounter addended by: Donna Ramos, PT on: 7/30/2019 7:52 AM   Actions taken: Sign clinical note, Document created, Document edited

## 2019-07-30 NOTE — PROGRESS NOTES
[facility]        OUTPATIENT PHYSICAL THERAPY FUNCTIONAL EVALUATION  PLAN OF TREATMENT FOR OUTPATIENT REHABILITATION  (COMPLETE FOR INITIAL CLAIMS ONLY)  Patient's Last Name, First Name, M.I.  YOB: 1945  Carmel Nazario     Provider's Name   [facility]   Medical Record No.  [id number]     Start of Care Date:  07/24/19   Onset Date:  07/06/19   Type:     _X__PT   ____OT  ____SLP Medical Diagnosis:  Benign Paroxysmal Positional Vertigo     PT Diagnosis:  L sided posterior canalithasis BPPV Visits from SOC:  1                              __________________________________________________________________________________  Plan of Treatment/Functional Goals:  balance training, gait training, joint mobilization, neuromuscular re-education, ROM, strengthening, stretching, manual therapy  SCR  Ultrasound, TENS, Electrical Stimulation/Russion Stimulation, Cryotherapy     GOALS  Andrés hallpike  Patient will have negative testing with L andrés hallpike in order to improve bed mobility.  09/04/19    bed mobility  Patient will report a 50% improvement in symptoms with bed mobility in order to have increased safety with transfers  08/21/19    DHI  Patient will have a 10 point reduction on the DHI in order to have increased safety with ambulation.  10/16/19    Therapy Frequency:  2 times/Week   Predicted Duration of Therapy Intervention:  12 weeks    Donna Ramos, PT                                    I CERTIFY THE NEED FOR THESE SERVICES FURNISHED UNDER        THIS PLAN OF TREATMENT AND WHILE UNDER MY CARE     (Physician co-signature of this document indicates review and certification of the therapy plan).                Certification Date From:  07/24/19   Certification Date To:  10/16/19    Referring Provider:  LEOPOLDO Ortez    Initial Assessment  See Epic Evaluation- Start of  Care Date: 07/24/19

## 2019-07-31 ENCOUNTER — HOSPITAL ENCOUNTER (OUTPATIENT)
Dept: PHYSICAL THERAPY | Facility: CLINIC | Age: 74
Setting detail: THERAPIES SERIES
End: 2019-07-31
Attending: PHYSICIAN ASSISTANT
Payer: MEDICARE

## 2019-07-31 PROCEDURE — 95992 CANALITH REPOSITIONING PROC: CPT | Mod: GP | Performed by: PHYSICAL THERAPIST

## 2019-08-02 ENCOUNTER — HOSPITAL ENCOUNTER (OUTPATIENT)
Dept: PHYSICAL THERAPY | Facility: CLINIC | Age: 74
Setting detail: THERAPIES SERIES
End: 2019-08-02
Attending: PHYSICIAN ASSISTANT
Payer: MEDICARE

## 2019-08-02 PROCEDURE — 95992 CANALITH REPOSITIONING PROC: CPT | Mod: GP | Performed by: PHYSICAL THERAPIST

## 2019-08-05 ENCOUNTER — HOSPITAL ENCOUNTER (OUTPATIENT)
Dept: PHYSICAL THERAPY | Facility: CLINIC | Age: 74
Setting detail: THERAPIES SERIES
End: 2019-08-05
Attending: PHYSICIAN ASSISTANT
Payer: MEDICARE

## 2019-08-05 PROCEDURE — 95992 CANALITH REPOSITIONING PROC: CPT | Mod: GP | Performed by: PHYSICAL THERAPIST

## 2019-08-07 ENCOUNTER — HOSPITAL ENCOUNTER (OUTPATIENT)
Dept: PHYSICAL THERAPY | Facility: CLINIC | Age: 74
Setting detail: THERAPIES SERIES
End: 2019-08-07
Attending: PHYSICIAN ASSISTANT
Payer: MEDICARE

## 2019-08-07 PROCEDURE — 95992 CANALITH REPOSITIONING PROC: CPT | Mod: GP | Performed by: PHYSICAL THERAPIST

## 2019-08-08 ENCOUNTER — NURSE TRIAGE (OUTPATIENT)
Dept: NURSING | Facility: CLINIC | Age: 74
End: 2019-08-08

## 2019-08-08 ENCOUNTER — HOSPITAL ENCOUNTER (EMERGENCY)
Facility: CLINIC | Age: 74
Discharge: HOME | End: 2019-08-08
Attending: EMERGENCY MEDICINE | Admitting: EMERGENCY MEDICINE
Payer: MEDICARE

## 2019-08-08 ENCOUNTER — APPOINTMENT (OUTPATIENT)
Dept: CT IMAGING | Facility: CLINIC | Age: 74
End: 2019-08-08
Attending: EMERGENCY MEDICINE
Payer: MEDICARE

## 2019-08-08 VITALS
OXYGEN SATURATION: 95 % | TEMPERATURE: 98.4 F | SYSTOLIC BLOOD PRESSURE: 127 MMHG | BODY MASS INDEX: 28.93 KG/M2 | WEIGHT: 180 LBS | DIASTOLIC BLOOD PRESSURE: 77 MMHG | HEIGHT: 66 IN | HEART RATE: 63 BPM | RESPIRATION RATE: 12 BRPM

## 2019-08-08 DIAGNOSIS — R42 VERTIGO: ICD-10-CM

## 2019-08-08 PROCEDURE — 99285 EMERGENCY DEPT VISIT HI MDM: CPT | Mod: 25

## 2019-08-08 PROCEDURE — 96374 THER/PROPH/DIAG INJ IV PUSH: CPT

## 2019-08-08 PROCEDURE — 25000128 H RX IP 250 OP 636: Performed by: EMERGENCY MEDICINE

## 2019-08-08 PROCEDURE — 70450 CT HEAD/BRAIN W/O DYE: CPT

## 2019-08-08 PROCEDURE — 93005 ELECTROCARDIOGRAM TRACING: CPT

## 2019-08-08 RX ORDER — DIAZEPAM 5 MG
5 TABLET ORAL EVERY 6 HOURS PRN
Qty: 15 TABLET | Refills: 0 | Status: SHIPPED | OUTPATIENT
Start: 2019-08-08 | End: 2020-01-08

## 2019-08-08 RX ORDER — DIAZEPAM 10 MG/2ML
2.5 INJECTION, SOLUTION INTRAMUSCULAR; INTRAVENOUS ONCE
Status: COMPLETED | OUTPATIENT
Start: 2019-08-08 | End: 2019-08-08

## 2019-08-08 RX ADMIN — DIAZEPAM 2.5 MG: 5 INJECTION, SOLUTION INTRAMUSCULAR; INTRAVENOUS at 10:01

## 2019-08-08 ASSESSMENT — MIFFLIN-ST. JEOR: SCORE: 1343.22

## 2019-08-08 ASSESSMENT — ENCOUNTER SYMPTOMS
NAUSEA: 1
DIARRHEA: 0

## 2019-08-08 NOTE — ED TRIAGE NOTES
Patient reports 5 weeks of dizziness which has been diagnosed as vertigo. Patient reports it is worse today.

## 2019-08-08 NOTE — ED PROVIDER NOTES
History     Chief Complaint:  Dizziness    The history is provided by the patient and the spouse.      Carmel Nazario is a 73 year old female with a history of vertigo who presents with dizziness. The patient states that she is not convinced that her dizziness is caused by vertigo any more as the medication she has been given is not helping. The patient states she has had vertigo for the past 5 weeks. When the dizziness started she sent an e-mail to the [facility] as she thought it was an inner ear infection. When she was evaluated, there was initial thought that there was fluid in her ear, so she was started on Meclizine which did not seem to help. The patient was then started on a Zpak for a possible sinus infection. The patient was then set up with physical therapy and has been following this. She states she was initially improving with physical therapy, but yesterday the dizziness became worse. The patient reports the dizziness is worse in the morning but is constant throughout the day. She notes she becomes more dizzy with turning her head quickly. The patient notes some dry heaving last night. The patient denies diarrhea. The patient's  states the patient has been undergoing a lot of stress recently with her daughter having a heart attack and her  needing surgery. He believes she is internalizing her stress.     Allergies:  Fosamax  Penicillins  Sulfa drugs     Medications:    Hydrochlorothiazide  Irbesartan  Latanoprost  Metoprolol succinate  Potassium chloride     Past Medical History:    Anxiety  Colon polyp  Diabetes mellitus  Fatty liver disease  Hip bursitis   Hypertension  Hyperlipidemia  Osteoarthritis   PSVT  Renal lithiasis   Skin cancer  Type 2 diabetes   Vertigo    Past Surgical History:    Biopsy  Total hip arthroplasty  Corrective eye surgeries  Tubal ligation  Laparoscopic cholecystectomy     Family History:    Brother: colorectal cancer, cerebrovascular disease  Father:  "skin cancer, hypertension  Brother: colon cancer     Social History:  The patient was accompanied to the ED by .  Smoking Status: former smoker  Smokeless Tobacco: Never Used  Alcohol Use: Yes  Drug Use: No  PCP: Rimma Casarez  Marital Status:       Review of Systems   Gastrointestinal: Positive for nausea. Negative for diarrhea.   All other systems reviewed and are negative.    Physical Exam     Patient Vitals for the past 24 hrs:   BP Temp Temp src Pulse Heart Rate Resp SpO2 Height Weight   08/08/19 1140 127/77 -- -- 63 -- 12 95 % -- --   08/08/19 1120 125/81 -- -- 59 -- 14 91 % -- --   08/08/19 1100 133/78 -- -- 59 -- -- 94 % -- --   08/08/19 1040 (!) 130/90 -- -- 64 -- -- 94 % -- --   08/08/19 1020 118/83 -- -- 60 -- 14 -- -- --   08/08/19 1000 139/78 -- -- 61 -- -- -- -- --   08/08/19 0950 (!) 170/120 -- -- 61 -- -- 97 % -- --   08/08/19 0944 (!) 190/101 98.4  F (36.9  C) Oral 70 70 16 95 % 1.676 m (5' 6\") 81.6 kg (180 lb)       Physical Exam  Vitals: reviewed by me  General: Pt seen on [facility], cooperative, and alert to conversation  Eyes: Tracking well, clear conjunctiva BL, extraocular movements are intact.  ENT: MMM, midline trachea.  No nystagmus noted with full range of motion to neck.  Lungs:   No tachypnea, no accessory muscle use. No respiratory distress.   CV: Rate as above, regular rhythm.    Abd: Soft, non tender, no guarding, no rebound. Non distended  MSK: no peripheral edema or joint effusion.  No evidence of trauma  Skin: No rash, normal turgor and temperature  Neuro: Clear speech and no facial droop.  Bilateral upper extremities and bilateral lower extremities are with sensation intact light touch and 5 out of 5 motor throughout.  Normal finger-to-nose, normal rapid alternating movements, normal strong and steady gait.  Psych: Not RIS, no e/o AH/VH      Emergency Department Course   ECG:  ECG taken at 0938, ECG read at 0940  Normal sinus rhythm  Nonspecific T " wave abnormalities  Abnormal ECG  Rate 69 bpm. DC interval 148 ms. QRS duration 88 ms. QT/QTc 384/411 ms. P-R-T axes 21 19 2.    Imaging:  Radiology findings were communicated with the patient who voiced understanding of the findings.    CT Head w/o Contrast  1. No evidence of acute intracranial hemorrhage, mass, or herniation.  2. Mild diffuse parenchymal volume loss and white matter changes  likely due to chronic microvascular ischemic disease.  GISELLE BALDERRAMA MD  Reading per radiology    Interventions:  1001 valium 2.5 mg IV    Emergency Department Course:  Nursing notes, past medical history, and vitals reviewed.    0955 I performed an exam of the patient as documented above.     1034 The patient was sent for a CT Head while in the emergency department, results above.     1135 I returned to update the patient. Findings and plan explained to the Patient. Patient discharged home with instructions regarding supportive care, medications, and reasons to return. The importance of close follow-up was reviewed. The patient was prescribed Valium    Impression & Plan    Medical Decision Making:  Carmel Nazario is a very pleasant 73 year old female who presents to the emergency department today for evaluation of vertigo for the last 5 weeks. This appears to be paroxysmal positional vertigo, and she has no cerebellar signs here. She has a reassuring neurologic exam. CT scan preformed out of an abundance of caution, given morning predominance, no evidence of head mass or CMS involvement. I did not think this was a posterior circulation stroke given the waxing and waning category, and she has had a thorough vestibular work up already. Nothing actionable today regardless as this has been present for 5 weeks. She feels improved with Valium here and will give some for home use. She can follow with neurology, and get additional testing and imaging as necessary in the outpatient setting. The patient feels comfortable with plan and  was discharged as above.     Diagnosis:    ICD-10-CM    1. Vertigo R42       Disposition:   The patient is discharged to home.    Discharge Medications:     START taking      Dose / Directions   diazepam 5 MG tablet  Commonly known as:  VALIUM      Dose:  5 mg  Take 1 tablet (5 mg) by mouth every 6 hours as needed for anxiety (vertigo)  Quantity:  15 tablet  Refills:  0           Where to get your medicines      Some of these will need a paper prescription and others can be bought over the counter. Ask your nurse if you have questions.    Bring a paper prescription for each of these medications    diazepam 5 MG tablet         Scribe Disclosure:  DONY, Monse Fontaine, am serving as a scribe at 9:58 AM on 8/8/2019 to document services personally performed by Jaxson Piña MD based on my observations and the provider's statements to me.   EMERGENCY DEPARTMENT       Jaxson Piña MD  08/08/19 1687

## 2019-08-08 NOTE — ED AVS SNAPSHOT
Emergency Department  [address] [place] [address]  Phone:  [phone]  Fax:  [phone]                                    Carmel Nazario   MRN: [id number]    Department:   Emergency Department   Date of Visit:  8/8/2019           After Visit Summary Signature Page    I have received my discharge instructions, and my questions have been answered. I have discussed any challenges I see with this plan with the nurse or doctor.    ..........................................................................................................................................  Patient/Patient Representative Signature      ..........................................................................................................................................  Patient Representative Print Name and Relationship to Patient    ..................................................               ................................................  Date                                   Time    ..........................................................................................................................................  Reviewed by Signature/Title    ...................................................              ..............................................  Date                                               Time          22EPIC Rev 08/18

## 2019-08-08 NOTE — TELEPHONE ENCOUNTER
She is going to go to the ER, probably in [place], [place]. They're currently in [place].  She wants to have a CT Scan done since the dizziness is worse. She is going to PT, as ordered, but it doesn't seem to be helping, she thinks she's worse.  Mary Horn RN-[facility]      Reason for Disposition    [1] Dizziness (vertigo) present now AND [2] one or more stroke risk factors (i.e., hypertension, diabetes, prior stroke/TIA/heart attack)  (Exception: prior physician evaluation for this AND no different/worse than usual)    Additional Information    Negative: [1] Weakness (i.e., paralysis, loss of muscle strength) of the face, arm or leg on one side of the body AND [2] sudden onset AND [3] present now    Negative: [1] Numbness (i.e., loss of sensation) of the face, arm or leg on one side of the body AND [2] sudden onset AND [3] present now    Negative: [1] Loss of speech or garbled speech AND [2] sudden onset AND [3] present now    Negative: Difficult to awaken or acting confused (e.g., disoriented, slurred speech)    Negative: Sounds like a life-threatening emergency to the triager    Negative: Followed a head injury    Negative: Followed an ear injury    Negative: Localized weakness or numbness is main symptom    Negative: Dizziness relates to riding in a car, going to an amusement park, etc.    Negative: [1] Dizziness is main symptom AND [2] NO spinning sensation (i.e., vertigo)    Negative: SEVERE dizziness (vertigo) (e.g., unable to walk without assistance)    Protocols used: DIZZINESS - VERTIGO-A-

## 2019-08-09 ENCOUNTER — HOSPITAL ENCOUNTER (OUTPATIENT)
Dept: PHYSICAL THERAPY | Facility: CLINIC | Age: 74
Setting detail: THERAPIES SERIES
End: 2019-08-09
Attending: PHYSICIAN ASSISTANT
Payer: MEDICARE

## 2019-08-09 PROCEDURE — 95992 CANALITH REPOSITIONING PROC: CPT | Mod: GP | Performed by: PHYSICAL THERAPIST

## 2019-08-12 ENCOUNTER — HOSPITAL ENCOUNTER (OUTPATIENT)
Dept: PHYSICAL THERAPY | Facility: CLINIC | Age: 74
Setting detail: THERAPIES SERIES
End: 2019-08-12
Attending: PHYSICIAN ASSISTANT
Payer: MEDICARE

## 2019-08-12 PROCEDURE — 95992 CANALITH REPOSITIONING PROC: CPT | Mod: GP | Performed by: PHYSICAL THERAPIST

## 2019-08-14 ENCOUNTER — HOSPITAL ENCOUNTER (OUTPATIENT)
Dept: PHYSICAL THERAPY | Facility: CLINIC | Age: 74
Setting detail: THERAPIES SERIES
End: 2019-08-14
Attending: PHYSICIAN ASSISTANT
Payer: MEDICARE

## 2019-08-14 PROCEDURE — 95992 CANALITH REPOSITIONING PROC: CPT | Mod: GP | Performed by: PHYSICAL THERAPIST

## 2019-08-16 ENCOUNTER — HOSPITAL ENCOUNTER (OUTPATIENT)
Dept: PHYSICAL THERAPY | Facility: CLINIC | Age: 74
Setting detail: THERAPIES SERIES
End: 2019-08-16
Attending: PHYSICIAN ASSISTANT
Payer: MEDICARE

## 2019-08-16 PROCEDURE — 95992 CANALITH REPOSITIONING PROC: CPT | Mod: GP | Performed by: PHYSICAL THERAPIST

## 2019-08-26 ENCOUNTER — HOSPITAL ENCOUNTER (OUTPATIENT)
Dept: PHYSICAL THERAPY | Facility: CLINIC | Age: 74
Setting detail: THERAPIES SERIES
End: 2019-08-26
Attending: PHYSICIAN ASSISTANT
Payer: MEDICARE

## 2019-08-26 PROCEDURE — 95992 CANALITH REPOSITIONING PROC: CPT | Mod: GP | Performed by: PHYSICAL THERAPIST

## 2019-08-28 ENCOUNTER — HOSPITAL ENCOUNTER (OUTPATIENT)
Dept: PHYSICAL THERAPY | Facility: CLINIC | Age: 74
Setting detail: THERAPIES SERIES
End: 2019-08-28
Attending: PHYSICIAN ASSISTANT
Payer: MEDICARE

## 2019-08-28 PROCEDURE — 95992 CANALITH REPOSITIONING PROC: CPT | Mod: GP | Performed by: PHYSICAL THERAPIST

## 2019-08-30 ENCOUNTER — HOSPITAL ENCOUNTER (OUTPATIENT)
Dept: PHYSICAL THERAPY | Facility: CLINIC | Age: 74
Setting detail: THERAPIES SERIES
End: 2019-08-30
Attending: PHYSICIAN ASSISTANT
Payer: MEDICARE

## 2019-08-30 PROCEDURE — 97112 NEUROMUSCULAR REEDUCATION: CPT | Mod: GP | Performed by: PHYSICAL THERAPIST

## 2019-09-04 ENCOUNTER — HOSPITAL ENCOUNTER (OUTPATIENT)
Dept: PHYSICAL THERAPY | Facility: CLINIC | Age: 74
Setting detail: THERAPIES SERIES
End: 2019-09-04
Attending: PHYSICIAN ASSISTANT
Payer: MEDICARE

## 2019-09-04 PROCEDURE — 95992 CANALITH REPOSITIONING PROC: CPT | Mod: GP | Performed by: PHYSICAL THERAPIST

## 2019-09-11 ENCOUNTER — HOSPITAL ENCOUNTER (OUTPATIENT)
Dept: PHYSICAL THERAPY | Facility: CLINIC | Age: 74
Setting detail: THERAPIES SERIES
End: 2019-09-11
Attending: PHYSICIAN ASSISTANT
Payer: MEDICARE

## 2019-09-11 PROCEDURE — 95992 CANALITH REPOSITIONING PROC: CPT | Mod: GP | Performed by: PHYSICAL THERAPIST

## 2019-09-13 ENCOUNTER — HOSPITAL ENCOUNTER (OUTPATIENT)
Dept: PHYSICAL THERAPY | Facility: CLINIC | Age: 74
Setting detail: THERAPIES SERIES
End: 2019-09-13
Attending: PHYSICIAN ASSISTANT
Payer: MEDICARE

## 2019-09-13 PROCEDURE — 95992 CANALITH REPOSITIONING PROC: CPT | Mod: GP | Performed by: PHYSICAL THERAPIST

## 2019-09-18 ENCOUNTER — HOSPITAL ENCOUNTER (OUTPATIENT)
Dept: PHYSICAL THERAPY | Facility: CLINIC | Age: 74
Setting detail: THERAPIES SERIES
End: 2019-09-18
Attending: PHYSICIAN ASSISTANT
Payer: MEDICARE

## 2019-09-18 PROCEDURE — 95992 CANALITH REPOSITIONING PROC: CPT | Mod: GP | Performed by: PHYSICAL THERAPIST

## 2019-09-23 ENCOUNTER — MYC MEDICAL ADVICE (OUTPATIENT)
Dept: FAMILY MEDICINE | Facility: CLINIC | Age: 74
End: 2019-09-23

## 2019-09-23 DIAGNOSIS — I10 HTN (HYPERTENSION), BENIGN: ICD-10-CM

## 2019-09-24 NOTE — TELEPHONE ENCOUNTER
"irbesartan (AVAPRO) 150 MG tablet 90 tablet 1 4/23/2019     Last Written Prescription Date:  4/23/2019  Last Fill Quantity: 90,  # refills: 1   Last office visit: 4/23/2019 with prescribing provider: ANTONIO Casarez    Future Office Visit: Unknown    Requested Prescriptions   Pending Prescriptions Disp Refills     irbesartan (AVAPRO) 150 MG tablet 90 tablet 1     Sig: Take 1 tablet (150 mg) by mouth At Bedtime       Angiotensin-II Receptors Passed - 9/24/2019  8:09 AM        Passed - Last blood pressure under 140/90 in past 12 months     BP Readings from Last 3 Encounters:   08/08/19 127/77   04/23/19 149/88   03/21/19 167/86                 Passed - Recent (12 mo) or future (30 days) visit within the authorizing provider's specialty     Patient had office visit in the last 12 months or has a visit in the next 30 days with authorizing provider or within the authorizing provider's specialty.  See \"Patient Info\" tab in inbasket, or \"Choose Columns\" in Meds & Orders section of the refill encounter.              Passed - Medication is active on med list        Passed - Patient is age 18 or older        Passed - No active pregnancy on record        Passed - Normal serum creatinine on file in past 12 months     Recent Labs   Lab Test 03/21/19  1033   CR 0.64             Passed - Normal serum potassium on file in past 12 months     Recent Labs   Lab Test 04/23/19  1125   POTASSIUM 4.0                    Passed - No positive pregnancy test in past 12 months          "

## 2019-09-25 RX ORDER — IRBESARTAN 150 MG/1
150 TABLET ORAL AT BEDTIME
Qty: 90 TABLET | Refills: 1 | Status: SHIPPED | OUTPATIENT
Start: 2019-09-25 | End: 2020-01-08

## 2019-10-07 ENCOUNTER — HOSPITAL ENCOUNTER (OUTPATIENT)
Dept: PHYSICAL THERAPY | Facility: CLINIC | Age: 74
Setting detail: THERAPIES SERIES
End: 2019-10-07
Attending: PHYSICIAN ASSISTANT
Payer: MEDICARE

## 2019-10-07 PROCEDURE — 95992 CANALITH REPOSITIONING PROC: CPT | Mod: GP | Performed by: PHYSICAL THERAPIST

## 2019-10-07 PROCEDURE — 97112 NEUROMUSCULAR REEDUCATION: CPT | Mod: GP,XU | Performed by: PHYSICAL THERAPIST

## 2019-10-08 NOTE — PROGRESS NOTES
Outpatient Physical Therapy Progress Note     Patient: Carmel Nazario  : 1945    Beginning/End Dates of Reporting Period:  19 to 10/8/2019    Referring Provider: LEOPOLDO Ortez    Therapy Diagnosis: L sided posterior canalithasis BPPV     Client Self Report: Reports occasional bad days that are more nausea than dizziness.  Reports she continues to have room spinning dizziness when she lays down and rolls to the right and night and when she gets up at night to use the rest room    Objective Measurements:  (+) R andrés hallpike for R posterior canalithasis  (+) R Roll test with R torsional upbeating nystagmus      Goals:  Goal Identifier [place] hallpike    Goal Description Patient will have negative testing with L andrés hallpike in order to improve bed mobility.   Target Date 19   Date Met  19   Progress:     Goal Identifier bed mobility   Goal Description Patient will report a 50% improvement in symptoms with bed mobility in order to have increased safety with transfers   Target Date 19   Date Met  19   Progress:     Goal Identifier DHI (long term goal)   Goal Description Patient will have a 10 point reduction on the DHI in order to have increased safety with ambulation.   Target Date 10/16/19   Date Met      Progress:     Progress Toward Goals:   Progress this reporting period: Continues to have (+) andrés hallpike with R posterior canal and R horizontal canal with (+) roll test.  Roll test demonstrated R torsional nystagmus vs. horizontal nystagmus which is atypical with horizontal canalithasis BPPV.  Feel it is important to continue to address BPPV symptoms due to increased risk of falls with getting up during the night to use the restroom.  Also feel patient demonstrates motion intolerance that contributes to her symptoms during the day and these symptoms are heighted with her anxiety.  Pt reports that her dizziness/nausea symptoms are worse when she is more stressed and anxious.  Feel the  patient would benefit from continued habituation exercises to address motion intolerance.  Continue to believe that patient will only require 1-2 more session.  Pt also instructed to f/u with ENT due to continued complaints of pressure in her R ear.  Patient has demonstrated slowed recovery due to multiple canal involvement and canals requiring 3-4 session per canal to clear prior to treating the next canal.  Patient has also demonstrated a slowed recover due to her motion intolerance also causing dizziness symptoms with movement.    Plan:  Changes to therapy plan of care: pt to f/u in 2 weeks for reassessment    Discharge:  No    RECERTIFICATION    Carmel Nazario  1945    Session Number: 15 /[facility] since start of care.    Reasons for Continuing Treatment:   Continues to have (+) andrés hallpike with R posterior canal and R horizontal canal with (+) roll test.  Roll test demonstrated R torsional nystagmus vs. horizontal nystagmus which is atypical with horizontal canalithasis BPPV.  Feel it is important to continue to address BPPV symptoms due to increased risk of falls with getting up during the night to use the restroom.  Also feel patient demonstrates motion intolerance that contributes to her symptoms during the day and these symptoms are heighted with her anxiety.  Pt reports that her dizziness/nausea symptoms are worse when she is more stressed and anxious.  Feel the patient would benefit from continued habituation exercises to address motion intolerance.  Continue to believe that patient will only require 1-2 more session.  Pt also instructed to f/u with ENT due to continued complaints of pressure in her R ear.  Patient has demonstrated slowed recovery due to multiple canal involvement and canals requiring 3-4 session per canal to clear prior to treating the next canal.  Patient has also demonstrated a slowed recover due to her motion intolerance also causing dizziness symptoms with  movement.    Frequency/Duration  1 times in 2-3 weeks for a total of 1 visits.    Recertification Period  10/7/19 - 10/28/19    Physician Signature:    Date:    X_______________________________________________________    Physician Name: LEOPOLDO Ortez    I certify the need for these services furnished under this plan of treatment and while under my care. Physician co-signature of this document indicates review and certification of the therapy plan.  This signature may be written on paper, or electronically signed within EPIC.

## 2019-10-22 ENCOUNTER — HOSPITAL ENCOUNTER (OUTPATIENT)
Dept: PHYSICAL THERAPY | Facility: CLINIC | Age: 74
Setting detail: THERAPIES SERIES
End: 2019-10-22
Attending: PHYSICIAN ASSISTANT
Payer: MEDICARE

## 2019-10-22 PROCEDURE — 97164 PT RE-EVAL EST PLAN CARE: CPT | Mod: GP | Performed by: PHYSICAL THERAPIST

## 2019-10-22 NOTE — PROGRESS NOTES
Outpatient Physical Therapy Discharge Note     Patient: Carmel Nazario  : 1945    Beginning/End Dates of Reporting Period:  19 to 10/22/2019    Referring Provider: LEOPOLDO Ortez    Therapy Diagnosis: L sided posterior canalithasis BPPV     Client Self Report: Continues to have periodic symptoms, however if she does her exercises she feels better.    Objective Measurements:  Infared Goggles  Gaze Evoked: Negative  Headshaking: Negative  Ana Maria Hallpike Left: Negative  Bly Hallpike Right: Negative  Supine Roll Test Left: Negative  Supine Roll Test Right: Negative    Outcome Measures (most recent score):  DHI: 16/100     Goals:  Goal Identifier Ana Maria hallpike    Goal Description Patient will have negative testing with L ana maria hallpike in order to improve bed mobility.   Target Date 19   Date Met  19   Progress:     Goal Identifier bed mobility   Goal Description Patient will report a 50% improvement in symptoms with bed mobility in order to have increased safety with transfers   Target Date 19   Date Met  19   Progress:     Goal Identifier DHI   Goal Description Patient will have a 10 point reduction on the DHI in order to have increased safety with ambulation.   Target Date 10/16/19   Date Met  10/22/19   Progress:     Progress Toward Goals:   Progress this reporting period: Patient demosntrated negative testing with all positional testing and vestibular testing.  Patient was seen for 16 sessions and treated for multiple canal involvement.  Patient required increased PT sessions due to needing to treat each canal independently of each others before treating another canal.  Patient also demonstrated increased symptoms with anxiety and motion sensativity.  Patient has met all goals and is independent with HEP and will continue to perform HEP in order to maintain symptoms.    Plan:  Discharge from therapy.    Discharge:    Reason for Discharge: Patient has met all goals.  No further expectation  of progress.  Patient chooses to discontinue therapy.    Equipment Issued: self BBPV treatment handouts    Discharge Plan: Patient to continue home program.    Please contact me with any questions or concerns.    Thank you for your referral,     Donna Ramos, PT, DPT, CLT  Physical Therapist & Certified Lymphedema Therapist  [address] [address]  [phone]

## 2019-10-22 NOTE — PROGRESS NOTES
Outpatient Physical Therapy Progress Note     Patient: Carmel Nazario  : 1945    Beginning/End Dates of Reporting Period:  19 to 2019    Referring Provider: LEOPOLDO Ortez    Therapy Diagnosis: L sided posterior canalithasis BPPV     Client Self Report: Only one episode of symptoms at night.  Feels it may have been due to traveling and doing too much that day.  Reports a significant improvement in symptoms.  Only feels slight dizziness at night.    Objective Measurements:  R ana maria hallpike (+)  L ana maria hallpike (-)  R roll test (-)    Outcome Measures (most recent score):  DHI: will reassess at discharge     Goals:  Goal Identifier Ana Maria hallpike (negative testing with L posterior and R hoizontal - however positive testing for R posterior)   Goal Description Patient will have negative testing with L ana maria hallpike in order to improve bed mobility.   Target Date 19   Date Met      Progress:     Goal Identifier bed mobility   Goal Description Patient will report a 50% improvement in symptoms with bed mobility in order to have increased safety with transfers   Target Date 19   Date Met  19   Progress:     Goal Identifier DHI (long term goal)   Goal Description Patient will have a 10 point reduction on the DHI in order to have increased safety with ambulation.   Target Date 10/16/19   Date Met      Progress:     Progress Toward Goals:   Progress this reporting period: Patient continues to demosntrate negative testing on L posterior ana maria hallpike and R horizontal roll test, however demonstrates R upbeating nystagmus with R ana maria hallpike.  Patient has had a prolonged recovery due to multiple canal involvement and each canal requiring 3-4 sessions each to treat prior to treating the next canal.  Patient also gets an increase in dizziness with anxiety and feel this is also prolonging the recovery.    Plan:  Continue therapy per current plan of care.    Discharge:  No

## 2019-11-04 ENCOUNTER — HEALTH MAINTENANCE LETTER (OUTPATIENT)
Age: 74
End: 2019-11-04

## 2019-11-27 ENCOUNTER — TELEPHONE (OUTPATIENT)
Dept: FAMILY MEDICINE | Facility: CLINIC | Age: 74
End: 2019-11-27

## 2019-11-27 NOTE — TELEPHONE ENCOUNTER
Please abstract the following data from this visit with this patient into the appropriate field in Epic:    Tests that can be patient reported without a hard copy:    Eye exam with ophthalmology on this date: 7-1-2019 at [facility]    Other Tests found in the patient's chart through Chart Review/Care Everywhere:    Lizzy Plascencia MA  .

## 2019-12-13 ENCOUNTER — TRANSFERRED RECORDS (OUTPATIENT)
Dept: HEALTH INFORMATION MANAGEMENT | Facility: CLINIC | Age: 74
End: 2019-12-13

## 2019-12-23 ENCOUNTER — TRANSFERRED RECORDS (OUTPATIENT)
Dept: HEALTH INFORMATION MANAGEMENT | Facility: CLINIC | Age: 74
End: 2019-12-23

## 2020-01-02 ENCOUNTER — TRANSFERRED RECORDS (OUTPATIENT)
Dept: HEALTH INFORMATION MANAGEMENT | Facility: CLINIC | Age: 75
End: 2020-01-02

## 2020-01-02 LAB — TSH SERPL-ACNC: 2.69 UIU/ML (ref 0.3–5)

## 2020-01-07 ENCOUNTER — TRANSFERRED RECORDS (OUTPATIENT)
Dept: HEALTH INFORMATION MANAGEMENT | Facility: CLINIC | Age: 75
End: 2020-01-07

## 2020-01-08 ENCOUNTER — OFFICE VISIT (OUTPATIENT)
Dept: FAMILY MEDICINE | Facility: CLINIC | Age: 75
End: 2020-01-08
Payer: MEDICARE

## 2020-01-08 VITALS
HEIGHT: 66 IN | DIASTOLIC BLOOD PRESSURE: 72 MMHG | BODY MASS INDEX: 30.05 KG/M2 | TEMPERATURE: 98.1 F | OXYGEN SATURATION: 98 % | HEART RATE: 60 BPM | WEIGHT: 187 LBS | SYSTOLIC BLOOD PRESSURE: 130 MMHG

## 2020-01-08 DIAGNOSIS — R11.0 NAUSEA: ICD-10-CM

## 2020-01-08 DIAGNOSIS — D35.2 PITUITARY MACROADENOMA (H): Primary | ICD-10-CM

## 2020-01-08 DIAGNOSIS — E11.9 TYPE 2 DIABETES MELLITUS WITHOUT COMPLICATION, WITHOUT LONG-TERM CURRENT USE OF INSULIN (H): ICD-10-CM

## 2020-01-08 DIAGNOSIS — I10 HTN (HYPERTENSION), BENIGN: ICD-10-CM

## 2020-01-08 DIAGNOSIS — R42 VERTIGO: ICD-10-CM

## 2020-01-08 DIAGNOSIS — F41.9 ANXIETY: ICD-10-CM

## 2020-01-08 DIAGNOSIS — E78.5 HYPERLIPIDEMIA LDL GOAL <100: ICD-10-CM

## 2020-01-08 PROCEDURE — 99214 OFFICE O/P EST MOD 30 MIN: CPT | Performed by: PHYSICIAN ASSISTANT

## 2020-01-08 RX ORDER — LORAZEPAM 0.5 MG/1
0.5 TABLET ORAL
Qty: 30 TABLET | Refills: 0 | Status: SHIPPED | OUTPATIENT
Start: 2020-01-08 | End: 2020-01-08

## 2020-01-08 RX ORDER — LORAZEPAM 0.5 MG/1
0.5 TABLET ORAL
Qty: 30 TABLET | Refills: 0 | Status: SHIPPED | OUTPATIENT
Start: 2020-01-08 | End: 2020-07-02

## 2020-01-08 RX ORDER — LOSARTAN POTASSIUM 50 MG/1
50 TABLET ORAL
COMMUNITY
Start: 2019-12-19 | End: 2020-01-08

## 2020-01-08 RX ORDER — OMEPRAZOLE 40 MG/1
40 CAPSULE, DELAYED RELEASE ORAL DAILY
Qty: 30 CAPSULE | Refills: 3 | Status: SHIPPED | OUTPATIENT
Start: 2020-01-08 | End: 2020-05-06

## 2020-01-08 RX ORDER — IRBESARTAN 150 MG/1
150 TABLET ORAL AT BEDTIME
Qty: 90 TABLET | Refills: 1 | Status: SHIPPED | OUTPATIENT
Start: 2020-01-08 | End: 2020-01-08

## 2020-01-08 RX ORDER — OMEPRAZOLE 40 MG/1
40 CAPSULE, DELAYED RELEASE ORAL DAILY
Qty: 30 CAPSULE | Refills: 3 | Status: SHIPPED | OUTPATIENT
Start: 2020-01-08 | End: 2020-05-26

## 2020-01-08 RX ORDER — LORAZEPAM 0.5 MG/1
0.5 TABLET ORAL
COMMUNITY
Start: 2019-12-29 | End: 2020-01-08

## 2020-01-08 ASSESSMENT — MIFFLIN-ST. JEOR: SCORE: 1369.98

## 2020-01-08 NOTE — PROGRESS NOTES
HPI: Carmel is a 73 yo female here for update.  She has had vertigo since July 6th  She initially had spinning but more recently is nausea and lightheadedness  She has done vestibular rehab without complete resolution of sxs but she did improve  She did see Dr. Zhang in ENT who checked her twice. Last month she did refer pt to a different dizziness clinic who agreed pt had BPPV (horizontal and vertical)  Dr. Zhang did order a MRI and has a pituitary macroadenoma and is followed by Dr. Donovan in endo and so far her labs are normal  July: She was seen for her eye exam and stable with cataract and glaucoma  8/8/19: ED visit for dizziness with neg CT head  She did see a PA in family practice last month; see notes in epic  He recd a statin but she hasn't started that yet as wants to figure out the dizziness first.  He did switch her avapro to losartan per her request to see if that was causing her dizziness but no change in her sxs.  She has dry heaves every morning that resolve once she has her coffee, tums does help    She is scheduled to leave for [place] next week for a month to stay in a timeshare  Her most bothersome sxs now is dizziness if she lays on her back  She is able to lay on her belly  She has sxs with looking up      Past Medical History:   Diagnosis Date     Anxiety      Colon polyp     colonoscopy 8/2006     Diabetes mellitus (H)      Fatty liver disease, nonalcoholic      Hip bursitis      HTN (hypertension), benign      Hyperlipidemia LDL goal < 100      OA (osteoarthritis) of hip     Dr. Garnica     Osteopenia     tried fosamax, but needed bone graft to jaw     PSVT (paroxysmal supraventricular tachycardia) (H)      Renal lithiasis      Skin cancer     BCC s/p Mohs     Type 2 diabetes, HbA1c goal < 7% (H)      Past Surgical History:   Procedure Laterality Date     BIOPSY  2014    Cheek     C TOTAL HIP ARTHROPLASTY  5-2014    R     COLONOSCOPY  2017    Polyp     corrective eye surgeries       GYN  "SURGERY      Tubes tied     LAPAROSCOPIC CHOLECYSTECTOMY       Social History     Tobacco Use     Smoking status: Former Smoker     Packs/day: 1.00     Years: 20.00     Pack years: 20.00     Types: Cigarettes     Start date: 1968     Last attempt to quit: 1987     Years since quittin.6     Smokeless tobacco: Never Used   Substance Use Topics     Alcohol use: Yes     Alcohol/week: 0.0 standard drinks     Comment: Occasional     Current Outpatient Medications   Medication Sig Dispense Refill     diazepam (VALIUM) 5 MG tablet Take 1 tablet (5 mg) by mouth every 6 hours as needed for anxiety (vertigo) 15 tablet 0     hydrochlorothiazide (HYDRODIURIL) 25 MG tablet Take 1 tablet (25 mg) by mouth daily 90 tablet 3     irbesartan (AVAPRO) 150 MG tablet Take 1 tablet (150 mg) by mouth At Bedtime 90 tablet 1     latanoprost (XALATAN) 0.005 % ophthalmic solution 1 drop At Bedtime.       LORazepam (ATIVAN) 0.5 MG tablet Take 0.5 mg by mouth       metoprolol succinate ER (TOPROL-XL) 100 MG 24 hr tablet Take 1 tablet (100 mg) by mouth daily 90 tablet 3     potassium chloride ER (K-DUR/KLOR-CON M) 20 MEQ CR tablet Take 1 tablet (20 mEq) by mouth daily 90 tablet 3     Allergies   Allergen Reactions     Fosamax [Alendronate Sodium]      Jaw problems     Penicillins      rash     Sulfa Drugs      GI upset     FAMILY HISTORY NOTED AND REVIEWED    REVIEW OF SYSTEMS: nausea, intermittent dizziness    PHYSICAL EXAM:    BP (!) 144/85 (BP Location: Left arm, Patient Position: Sitting, Cuff Size: Adult Large)   Pulse 60   Temp 98.1  F (36.7  C) (Oral)   Ht 1.676 m (5' 6\")   Wt 84.8 kg (187 lb)   SpO2 98%   Breastfeeding No   BMI 30.18 kg/m      Patient appears non toxic    Assessment and Plan:     (D35.2) Pituitary macroadenoma (H)  (primary encounter diagnosis)  Comment:   Plan: work up in place with Dr. Donovan from [facility]. Blood work normal so far.  24 hour urine pending.  Will likely need f/u MRI to monitor " lesion.    (R42) Vertigo  Comment:   Plan: BPPV suspected by ENT and dizziness clinic.  Recd she cont vestibular rehab.    (I10) HTN (hypertension), benign  Comment:   Plan:She decided that the dizziness not due to avapro so prefers to go back on that and stop the losartan. Refill.      (R11.0) Nausea  Comment: could be gastritis. Discussed dietary modifications.    Plan: recd trial of omeprazole (PRILOSEC) 40 MG DR capsule            (F41.9) Anxiety  Comment: discussed this can be habit forming so intended for short term use  Plan: LORazepam (ATIVAN) 0.5 MG tablet      (E11.9) Type 2 diabetes mellitus without complication, without long-term current use of insulin (H)  Comment:   Plan: diet controlled. Last A1c 6.5%. f/u 6 months. Cont diet.    (E78.5) Hyperlipidemia LDL goal <100  Comment:   Plan: pt refuses statin but recd she consider this.    Spent 30 minutes FTF with patient of which over 50% was spent discussing the coordination of care and management of their issues noted.        Rimma Casarez PA-C

## 2020-01-11 ENCOUNTER — TRANSFERRED RECORDS (OUTPATIENT)
Dept: HEALTH INFORMATION MANAGEMENT | Facility: CLINIC | Age: 75
End: 2020-01-11

## 2020-02-10 DIAGNOSIS — I10 HTN (HYPERTENSION), BENIGN: ICD-10-CM

## 2020-02-12 RX ORDER — POTASSIUM CHLORIDE 1500 MG/1
TABLET, EXTENDED RELEASE ORAL
Qty: 90 TABLET | Refills: 1 | Status: SHIPPED | OUTPATIENT
Start: 2020-02-12 | End: 2020-08-11

## 2020-02-12 RX ORDER — HYDROCHLOROTHIAZIDE 25 MG/1
TABLET ORAL
Qty: 90 TABLET | Refills: 1 | Status: SHIPPED | OUTPATIENT
Start: 2020-02-12 | End: 2020-05-06

## 2020-02-12 RX ORDER — METOPROLOL SUCCINATE 100 MG/1
TABLET, EXTENDED RELEASE ORAL
Qty: 90 TABLET | Refills: 1 | Status: SHIPPED | OUTPATIENT
Start: 2020-02-12 | End: 2020-08-11

## 2020-05-06 ENCOUNTER — VIRTUAL VISIT (OUTPATIENT)
Dept: FAMILY MEDICINE | Facility: CLINIC | Age: 75
End: 2020-05-06
Payer: MEDICARE

## 2020-05-06 DIAGNOSIS — I10 HTN (HYPERTENSION), BENIGN: ICD-10-CM

## 2020-05-06 DIAGNOSIS — G47.09 OTHER INSOMNIA: ICD-10-CM

## 2020-05-06 DIAGNOSIS — H81.10 BENIGN PAROXYSMAL POSITIONAL VERTIGO, UNSPECIFIED LATERALITY: ICD-10-CM

## 2020-05-06 DIAGNOSIS — F41.9 ANXIETY: ICD-10-CM

## 2020-05-06 DIAGNOSIS — R11.0 NAUSEA: ICD-10-CM

## 2020-05-06 DIAGNOSIS — I47.10 PSVT (PAROXYSMAL SUPRAVENTRICULAR TACHYCARDIA) (H): ICD-10-CM

## 2020-05-06 DIAGNOSIS — K14.6 BURNING MOUTH SYNDROME: Primary | ICD-10-CM

## 2020-05-06 DIAGNOSIS — R68.2 DRY MOUTH: ICD-10-CM

## 2020-05-06 DIAGNOSIS — D35.2 PITUITARY MACROADENOMA (H): ICD-10-CM

## 2020-05-06 PROCEDURE — 99443 ZZC PHYSICIAN TELEPHONE EVALUATION 21-30 MIN: CPT | Performed by: PHYSICIAN ASSISTANT

## 2020-05-06 NOTE — PROGRESS NOTES
"Carmel Nazario is a 74 year old female who is being evaluated via a billable telephone visit.      The patient has been notified of following:     \"This telephone visit will be conducted via a call between you and your physician/provider. We have found that certain health care needs can be provided without the need for a physical exam.  This service lets us provide the care you need with a short phone conversation.  If a prescription is necessary we can send it directly to your pharmacy.  If lab work is needed we can place an order for that and you can then stop by our lab to have the test done at a later time.    Telephone visits are billed at different rates depending on your insurance coverage. During this emergency period, for some insurers they may be billed the same as an in-person visit.  Please reach out to your insurance provider with any questions.    If during the course of the call the physician/provider feels a telephone visit is not appropriate, you will not be charged for this service.\"    Patient has given verbal consent for Telephone visit?  Yes    What phone number would you like to be contacted at? [phone]    How would you like to obtain your AVS? Tanjat    Subjective     Carmel Nazario is a 74 year old female who presents to clinic today for the following health issues:    HPI  Pt has had dizziness since July 6th, 2020  This comes and goes and currently has improved.  She is doing her home exercise.  Has done many PT sessions for vestibular rehab  Now has a dry mouth and stopped irbesartan to see if that would help  She had more heart palpit at night on irbesartan  She checks BP and getting low 130s/80s on toprol xl 100mg plus hydrochlorothiazide 25mg    Pt has lack of appetite but not losing weight  Has burning mouth like she burned her tongue starting in Oct.  She had some canker sores as well but those resolved.  Has nausea in the morning  R ear continues to click; she did see Dr. Zhang in " ENT; last visit was Dec 13th  Had MRI and CT of brain  Drinks one [place]hattan per day        Review of Systems   ROS COMP: Constitutional, HEENT, cardiovascular, pulmonary, GI, , musculoskeletal, neuro, skin, endocrine and psych systems are negative, except as otherwise noted.       Objective   Reported vitals:  There were no vitals taken for this visit.   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 She did see dentist about a month ago and she had a sore under her tongue and he gave her a gargle.    (R68.2) Dry mouth  Comment:   Plan: she has tried Biotene but didn't think it helped    (F41.9) Anxiety  Comment:  had heart ablation, Ritika (dtr) had MI last year and Kelly with multiple eye surgeries, lost a few family members.  Plan: rarely uses lorazepam    (I47.1) PSVT (paroxysmal supraventricular tachycardia) (H)  Comment:   Plan: cont toprol xl 100mg    (I10) HTN (hypertension), benign  Comment: discontinue hydrochlorothiazide due to burning mouth  Plan: check BP more freq and report back    (D35.2) Pituitary macroadenoma (H)  Comment:   Plan: followed by Dr. Donovan    (R11.0) Nausea  Comment: she is not taking prilosec at this point, but did restart this for a few days  Plan: stop advil pm, cut etoh and caffeine.    (H81.10) Benign paroxysmal positional vertigo, unspecified laterality  Comment: currently under control  Plan: working with vestibular rehab clinic    (G47.09) Other insomnia  Comment:  discontinue advil pm  Plan: melatonin 5mg      Rimma Casarez PA-C        Phone call duration:  33 minutes

## 2020-05-20 ENCOUNTER — VIRTUAL VISIT (OUTPATIENT)
Dept: FAMILY MEDICINE | Facility: CLINIC | Age: 75
End: 2020-05-20
Payer: MEDICARE

## 2020-05-20 DIAGNOSIS — B37.0 THRUSH: Primary | ICD-10-CM

## 2020-05-20 DIAGNOSIS — I10 HTN (HYPERTENSION), BENIGN: ICD-10-CM

## 2020-05-20 PROCEDURE — 99442: CPT | Performed by: PHYSICIAN ASSISTANT

## 2020-05-20 RX ORDER — HYDROCHLOROTHIAZIDE 25 MG/1
25 TABLET ORAL DAILY
Qty: 90 TABLET | Refills: 3 | COMMUNITY
Start: 2020-05-20 | End: 2020-08-11

## 2020-05-20 RX ORDER — CLOTRIMAZOLE 10 MG/1
10 LOZENGE ORAL
Qty: 70 EACH | Refills: 1 | Status: SHIPPED | OUTPATIENT
Start: 2020-05-20 | End: 2022-01-19

## 2020-05-20 NOTE — PROGRESS NOTES
"Carmel Nazario is a 74 year old female who is being evaluated via a billable telephone visit.      The patient has been notified of following:     \"This telephone visit will be conducted via a call between you and your physician/provider. We have found that certain health care needs can be provided without the need for a physical exam.  This service lets us provide the care you need with a short phone conversation.  If a prescription is necessary we can send it directly to your pharmacy.  If lab work is needed we can place an order for that and you can then stop by our lab to have the test done at a later time.    Telephone visits are billed at different rates depending on your insurance coverage. During this emergency period, for some insurers they may be billed the same as an in-person visit.  Please reach out to your insurance provider with any questions.    If during the course of the call the physician/provider feels a telephone visit is not appropriate, you will not be charged for this service.\"    Patient has given verbal consent for Telephone visit?  Yes    What phone number would you like to be contacted at? [phone]    How would you like to obtain your AVS? MyChart    Subjective     Carmel Nazario is a 74 year old female who presents via phone visit today for the following health issues:    HPI    Pt calling to f/u on HTN and burning mouth syndrome  We stopped her hydrochlorothiazide to see if that would help with her mouth but so far it hasn't helped with the burning mouth  She also tried Biotene without relief  Her mouth symptoms come and go  She sent me a text that shows a white patch in the middle of her tongue      Review of Systems   Constitutional, HEENT, cardiovascular, pulmonary, gi and gu systems are negative, except as otherwise noted.       Objective   Reported vitals:  There were no vitals taken for this visit.   healthy, alert and no distress  PSYCH: Alert and oriented times 3; coherent " speech, normal   rate and volume, able to articulate logical thoughts, able   to abstract reason, no tangential thoughts, no hallucinations   or delusions  Her affect is normal  Tongue: white plaque in the middle  Remainder of exam unable to be completed due to telephone visits      Assessment and Plan:     (B37.0) Thrush  (primary encounter diagnosis)  Comment:   Plan: clotrimazole 10 MG donte        5 times per day as needed.      (I10) HTN (hypertension), benign  Comment:   Plan: restart hydrochlorothiazide 25mg daily      Rimma Casarez PA-C              Phone call duration:  13 minutes

## 2020-06-03 ENCOUNTER — TRANSFERRED RECORDS (OUTPATIENT)
Dept: HEALTH INFORMATION MANAGEMENT | Facility: CLINIC | Age: 75
End: 2020-06-03

## 2020-06-05 ENCOUNTER — HOSPITAL PATHOLOGY (OUTPATIENT)
Dept: OTHER | Facility: CLINIC | Age: 75
End: 2020-06-05

## 2020-06-09 LAB — COPATH REPORT: NORMAL

## 2020-07-01 DIAGNOSIS — F41.9 ANXIETY: ICD-10-CM

## 2020-07-01 NOTE — TELEPHONE ENCOUNTER
Lorazepam      Last Written Prescription Date:  01/08/2020  Last Fill Quantity: 30,   # refills: 0  Last Office Visit: 05/20/2020  Future Office visit:       Routing refill request to provider for review/approval because:  Drug not on the [facility], [facility] or [facility] refill protocol or controlled substance    Nicole Maloney MA

## 2020-07-02 ENCOUNTER — TRANSFERRED RECORDS (OUTPATIENT)
Dept: HEALTH INFORMATION MANAGEMENT | Facility: CLINIC | Age: 75
End: 2020-07-02

## 2020-07-02 RX ORDER — LORAZEPAM 0.5 MG/1
0.5 TABLET ORAL
Qty: 30 TABLET | Refills: 0 | Status: SHIPPED | OUTPATIENT
Start: 2020-07-02 | End: 2020-09-25

## 2020-07-02 NOTE — TELEPHONE ENCOUNTER
Last [facility] website verification:  done on 07/02/2020 [place]  [email or web address]

## 2020-08-09 DIAGNOSIS — I10 HTN (HYPERTENSION), BENIGN: ICD-10-CM

## 2020-08-11 RX ORDER — POTASSIUM CHLORIDE 1500 MG/1
TABLET, EXTENDED RELEASE ORAL
Qty: 90 TABLET | Refills: 1 | Status: SHIPPED | OUTPATIENT
Start: 2020-08-11 | End: 2021-02-09

## 2020-08-11 RX ORDER — METOPROLOL SUCCINATE 100 MG/1
TABLET, EXTENDED RELEASE ORAL
Qty: 90 TABLET | Refills: 1 | Status: SHIPPED | OUTPATIENT
Start: 2020-08-11 | End: 2021-02-09

## 2020-08-11 RX ORDER — HYDROCHLOROTHIAZIDE 25 MG/1
TABLET ORAL
Qty: 90 TABLET | Refills: 2 | Status: SHIPPED | OUTPATIENT
Start: 2020-08-11 | End: 2021-05-06

## 2020-08-29 ENCOUNTER — TRANSFERRED RECORDS (OUTPATIENT)
Dept: HEALTH INFORMATION MANAGEMENT | Facility: CLINIC | Age: 75
End: 2020-08-29

## 2020-08-30 ENCOUNTER — TRANSFERRED RECORDS (OUTPATIENT)
Dept: HEALTH INFORMATION MANAGEMENT | Facility: CLINIC | Age: 75
End: 2020-08-30

## 2020-09-03 ENCOUNTER — TRANSFERRED RECORDS (OUTPATIENT)
Dept: HEALTH INFORMATION MANAGEMENT | Facility: CLINIC | Age: 75
End: 2020-09-03

## 2020-09-04 ENCOUNTER — PATIENT OUTREACH (OUTPATIENT)
Dept: CARE COORDINATION | Facility: CLINIC | Age: 75
End: 2020-09-04

## 2020-09-04 LAB — GLUCOSE SERPL-MCNC: 163 MG/DL (ref 65–100)

## 2020-09-04 NOTE — PROGRESS NOTES
Clinic Care Coordination Contact    Situation: Patient chart reviewed by care coordinator.    Background: Pt was admitted to [facility] 8/30-9/3 for complications of COVID symptoms.     Assessment: 9/3 she was transferred to [facility] for further neurology and nephrology evaluation.    Plan/Recommendations: CC SW will await discharge from [facility] to follow up regarding any needs for support.    LUCA Mccoy, [facility]  Clinic Care Coordinator  [facility]'s [facility]  [phone]  [email or web address]

## 2020-09-05 LAB — TSH SERPL-ACNC: 1.07 UIU/ML (ref 0.35–4.94)

## 2020-09-06 LAB
CREAT SERPL-MCNC: 0.76 MG/DL (ref 0.57–1.11)
GFR SERPL CREATININE-BSD FRML MDRD: >60 ML/MIN/1.73M2
POTASSIUM SERPL-SCNC: 4.6 MMOL/L (ref 3.5–5)

## 2020-09-09 ENCOUNTER — PATIENT OUTREACH (OUTPATIENT)
Dept: NURSING | Facility: CLINIC | Age: 75
End: 2020-09-09
Payer: MEDICARE

## 2020-09-09 SDOH — HEALTH STABILITY: MENTAL HEALTH
STRESS IS WHEN SOMEONE FEELS TENSE, NERVOUS, ANXIOUS, OR CAN'T SLEEP AT NIGHT BECAUSE THEIR MIND IS TROUBLED. HOW STRESSED ARE YOU?: NOT AT ALL

## 2020-09-09 SDOH — ECONOMIC STABILITY: TRANSPORTATION INSECURITY
IN THE PAST 12 MONTHS, HAS LACK OF TRANSPORTATION KEPT YOU FROM MEETINGS, WORK, OR FROM GETTING THINGS NEEDED FOR DAILY LIVING?: NO

## 2020-09-09 SDOH — SOCIAL STABILITY: SOCIAL NETWORK: HOW OFTEN DO YOU ATTEND CHURCH OR RELIGIOUS SERVICES?: MORE THAN 4 TIMES PER YEAR

## 2020-09-09 SDOH — ECONOMIC STABILITY: TRANSPORTATION INSECURITY
IN THE PAST 12 MONTHS, HAS THE LACK OF TRANSPORTATION KEPT YOU FROM MEDICAL APPOINTMENTS OR FROM GETTING MEDICATIONS?: NO

## 2020-09-09 SDOH — ECONOMIC STABILITY: INCOME INSECURITY: HOW HARD IS IT FOR YOU TO PAY FOR THE VERY BASICS LIKE FOOD, HOUSING, MEDICAL CARE, AND HEATING?: NOT HARD AT ALL

## 2020-09-09 SDOH — SOCIAL STABILITY: SOCIAL NETWORK
DO YOU BELONG TO ANY CLUBS OR ORGANIZATIONS SUCH AS CHURCH GROUPS UNIONS, FRATERNAL OR ATHLETIC GROUPS, OR SCHOOL GROUPS?: NOT ASKED

## 2020-09-09 SDOH — SOCIAL STABILITY: SOCIAL NETWORK: HOW OFTEN DO YOU GET TOGETHER WITH FRIENDS OR RELATIVES?: MORE THAN THREE TIMES A WEEK

## 2020-09-09 SDOH — ECONOMIC STABILITY: FOOD INSECURITY: WITHIN THE PAST 12 MONTHS, YOU WORRIED THAT YOUR FOOD WOULD RUN OUT BEFORE YOU GOT MONEY TO BUY MORE.: NEVER TRUE

## 2020-09-09 SDOH — SOCIAL STABILITY: SOCIAL NETWORK: IN A TYPICAL WEEK, HOW MANY TIMES DO YOU TALK ON THE PHONE WITH FAMILY, FRIENDS, OR NEIGHBORS?: THREE TIMES A WEEK

## 2020-09-09 SDOH — ECONOMIC STABILITY: FOOD INSECURITY: WITHIN THE PAST 12 MONTHS, THE FOOD YOU BOUGHT JUST DIDN'T LAST AND YOU DIDN'T HAVE MONEY TO GET MORE.: NEVER TRUE

## 2020-09-09 SDOH — SOCIAL STABILITY: SOCIAL NETWORK: ARE YOU MARRIED, WIDOWED, DIVORCED, SEPARATED, NEVER MARRIED, OR LIVING WITH A PARTNER?: MARRIED

## 2020-09-09 ASSESSMENT — ACTIVITIES OF DAILY LIVING (ADL): DEPENDENT_IADLS:: INDEPENDENT

## 2020-09-09 NOTE — PROGRESS NOTES
Clinic Care Coordination Contact    Clinic Care Coordination Contact  OUTREACH    Referral Information:  Referral Source: IP Report    Primary Diagnosis: Neurological Disorders    Chief Complaint   Patient presents with     Clinic Care Coordination - Initial     Clinic Care Coordination - Post Hospital        Universal Utilization: At [facility] from 8/30 to 9/3. At [facility] from 9/3 to 9/6.  Clinic Utilization  Difficulty keeping appointments:: No  Compliance Concerns: No  No-Show Concerns: No  No PCP office visit in Past Year: No  Utilization    Last refreshed: 9/9/2020  8:23 AM:  Hospital Admissions 0           Last refreshed: 9/9/2020  8:23 AM:  ED Visits 0           Last refreshed: 9/9/2020  8:23 AM:  No Show Count (past year) 0              Current as of: 9/9/2020  8:23 AM            Clinical Concerns:  Current Medical Concerns:   CC SW spoke with pt regarding her recent hospital stay. She shared that she is doing very well. She spoke with her new Endocrinologist yesterday about prednisone. She will be taking this for 2 weeks but she feels jittery and is not sleeping well with it. She took a lorazapam last night at bedtime and it helped her sleep, she plans to continue to take this before bed while she is on the prednisone. Pt shared that she will be staying with Dr. Baer as her go to for tumor for tumor. She has an appointment with him on 10/1 and will be getting an MRI. Pt is currently taking insulin shots only while on prednisone. She has begun metformin and plans to remain on this. She took her blood sugar today and it was 98.    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 This has caused swelling and the eye to go off to the left. She has no more double vision. She will be having a follow up with a Neurosurgeon Dr. Pitts after the MRI.    Pt explained that she had COVID previously and that is where all of these concerns started. It really affected her sodium levels, this  caused nausea and the retching caused issues with the tumor. Pt stated that she has not had a negative test but due to no longer having symptoms and so much time passing pt was moved while at [facility] to a regular room.       Current Behavioral Concerns: none    Education Provided to patient: CC role   Pain  Pain (GOAL):: No  Health Maintenance Reviewed: Not assessed  Clinical Pathway: None    Medication Management:  Post-discharge medication reconciliation status: Discharge medications reviewed and reconciled.  Changed medications per note/orders (see AVS).       Functional Status:  Dependent ADLs:: Independent  Dependent IADLs:: Independent  Bed or wheelchair confined:: No  Mobility Status: Independent  Fallen 2 or more times in the past year?: No  Any fall with injury in the past year?: No    Living Situation:  Current living arrangement:: I live in a private home with spouse  [facility]    Lifestyle & Psychosocial Needs:  Lifestyle     Physical activity     Days per week: Not on file     Minutes per session: Not on file     Stress: Not at all     Social Needs     Financial resource strain: Not hard at all     Food insecurity     Worry: Never true     Inability: Never true     Transportation needs     Medical: No     Non-medical: No     Diet:: Diabetic diet  Inadequate nutrition (GOAL):: No  Tube Feeding: No  Inadequate activity/exercise (GOAL):: No  Significant changes in sleep pattern (GOAL): No  Transportation means:: Regular car     Jewish or spiritual beliefs that impact treatment:: No  Mental health DX:: No  Mental health management concern (GOAL):: No  Informal Support system:: Children, Family, Spouse   Socioeconomic History     Marital status:      Spouse name: Not on file     Number of children: Not on file     Years of education: Not on file     Highest education level: Not on file   Relationships     Social connections     Talks on phone: Three times a week     Gets  together: More than three times a week     Attends Alevism service: More than 4 times per year     Active member of club or organization: Not on file     Attends meetings of clubs or organizations: Not on file     Relationship status:      Intimate partner violence     Fear of current or ex partner: Not on file     Emotionally abused: Not on file     Physically abused: Not on file     Forced sexual activity: Not on file     Tobacco Use     Smoking status: Former Smoker     Packs/day: 1.00     Years: 20.00     Pack years: 20.00     Types: Cigarettes     Start date: 1968     Last attempt to quit: 1987     Years since quittin.2     Smokeless tobacco: Never Used   Substance and Sexual Activity     Alcohol use: Yes     Alcohol/week: 0.0 standard drinks     Comment: Occasional     Drug use: No     Sexual activity: Yes     Partners: Male   Resources and Interventions:  Current Resources:      Community Resources: None  Supplies used at home:: Diabetic Supplies  Equipment Currently Used at Home: none    Advance Care Plan/Directive  Advanced Care Plans/Directives on file:: No    Referrals Placed: None     Patient/Caregiver understanding: Pt reports understanding and denies any additional questions or concerns at this times. SW CC engaged in AIDET communication during encounter.     Plan: At this time, pt denies outstanding need for connection or referral to resources or assistance navigating recommended follow up care. No further outreaches will be made at this time unless a new referral is made or a change in the pt's status occurs. Patient was provided with CC SW contact information and encouraged to call with any questions or concerns.    LUCA Mccoy, [facility]  Clinic Care Coordinator  [facility]  [phone]  [email or web address]

## 2020-09-09 NOTE — LETTER
[facility]  [address] [place] [address]    September 9, 2020    Carmel Nazario  [address] [place] [address]      Dear Carmel,    I am a clinic care coordinator who works with Rimma Casarez PA-C at [facility]. I wanted to thank you for spending the time to talk with me.  Below is a description of clinic care coordination and how I can further assist you.      The clinic care coordination team is made up of a registered nurse,  and community health worker who understand the health care system. The goal of clinic care coordination is to help you manage your health and improve access to the health care system in the most efficient manner. The team can assist you in meeting your health care goals by providing education, coordinating services, strengthening the communication among your providers and supporting you with any resource needs.    Please feel free to contact me at [phone] with any questions or concerns. We are focused on providing you with the highest-quality healthcare experience possible and that all starts with you.     Sincerely,     LUCA Mccoy, [facility]  Clinic Care Coordinator  [facility]  [phone]  [email or web address]

## 2020-09-23 DIAGNOSIS — F41.9 ANXIETY: ICD-10-CM

## 2020-09-24 NOTE — TELEPHONE ENCOUNTER
Refill request:    LORAZEPAM 0.5 MG TABLET    Summary: Take 1 tablet (0.5 mg) by mouth nightly as needed for anxiety, Disp-30 tablet,R-0, E-Prescribe   Dose, Route, Frequency: 0.5 mg, Oral, AT BEDTIME PRN  Start: 7/2/2020  Ord/Sold: 7/2/2020

## 2020-09-25 RX ORDER — LORAZEPAM 0.5 MG/1
0.5 TABLET ORAL
Qty: 30 TABLET | Refills: 0 | Status: SHIPPED | OUTPATIENT
Start: 2020-09-25 | End: 2021-05-06

## 2020-09-25 NOTE — TELEPHONE ENCOUNTER
Pt call this morning requesting to get Lorazepam refill by today. Pt only have 1 tablet left. Please reach out to the pt when Meds has been refills.

## 2020-09-25 NOTE — TELEPHONE ENCOUNTER
Tried checking , site down currently  Routing refill request to provider for review/approval because:  Drug not on the [facility] refill protocol   Last visit - virtual 5/20/2020     Radha AL RN

## 2020-11-22 ENCOUNTER — HEALTH MAINTENANCE LETTER (OUTPATIENT)
Age: 75
End: 2020-11-22

## 2020-11-22 DIAGNOSIS — R11.0 NAUSEA: ICD-10-CM

## 2020-11-23 RX ORDER — OMEPRAZOLE 40 MG/1
CAPSULE, DELAYED RELEASE ORAL
Qty: 90 CAPSULE | Refills: 3 | Status: SHIPPED | OUTPATIENT
Start: 2020-11-23 | End: 2021-11-18

## 2021-02-07 DIAGNOSIS — I10 HTN (HYPERTENSION), BENIGN: ICD-10-CM

## 2021-02-07 RX ORDER — IRBESARTAN 150 MG/1
TABLET ORAL
Qty: 90 TABLET | Refills: 0 | Status: SHIPPED | OUTPATIENT
Start: 2021-02-07 | End: 2021-05-06

## 2021-02-11 ENCOUNTER — TRANSFERRED RECORDS (OUTPATIENT)
Dept: HEALTH INFORMATION MANAGEMENT | Facility: CLINIC | Age: 76
End: 2021-02-11

## 2021-05-06 ENCOUNTER — TELEPHONE (OUTPATIENT)
Dept: FAMILY MEDICINE | Facility: CLINIC | Age: 76
End: 2021-05-06

## 2021-05-06 ENCOUNTER — TRANSFERRED RECORDS (OUTPATIENT)
Dept: HEALTH INFORMATION MANAGEMENT | Facility: CLINIC | Age: 76
End: 2021-05-06

## 2021-05-06 ENCOUNTER — OFFICE VISIT (OUTPATIENT)
Dept: FAMILY MEDICINE | Facility: CLINIC | Age: 76
End: 2021-05-06
Payer: MEDICARE

## 2021-05-06 VITALS
SYSTOLIC BLOOD PRESSURE: 142 MMHG | HEART RATE: 67 BPM | DIASTOLIC BLOOD PRESSURE: 84 MMHG | BODY MASS INDEX: 30.18 KG/M2 | OXYGEN SATURATION: 97 % | TEMPERATURE: 97.7 F | WEIGHT: 187.8 LBS | HEIGHT: 66 IN

## 2021-05-06 DIAGNOSIS — F41.9 ANXIETY: ICD-10-CM

## 2021-05-06 DIAGNOSIS — I10 HTN (HYPERTENSION), BENIGN: ICD-10-CM

## 2021-05-06 DIAGNOSIS — Z01.818 PREOP GENERAL PHYSICAL EXAM: ICD-10-CM

## 2021-05-06 DIAGNOSIS — H26.9 CATARACT OF BOTH EYES, UNSPECIFIED CATARACT TYPE: ICD-10-CM

## 2021-05-06 DIAGNOSIS — E11.9 TYPE 2 DIABETES MELLITUS WITHOUT COMPLICATION, WITHOUT LONG-TERM CURRENT USE OF INSULIN (H): ICD-10-CM

## 2021-05-06 DIAGNOSIS — Z00.00 ENCOUNTER FOR ANNUAL WELLNESS VISIT (AWV) IN MEDICARE PATIENT: Primary | ICD-10-CM

## 2021-05-06 LAB
ALBUMIN SERPL-MCNC: 4.3 G/DL (ref 3.4–5)
ALP SERPL-CCNC: 71 U/L (ref 40–150)
ALT SERPL W P-5'-P-CCNC: 83 U/L (ref 0–50)
ANION GAP SERPL CALCULATED.3IONS-SCNC: 4 MMOL/L (ref 3–14)
AST SERPL W P-5'-P-CCNC: 64 U/L (ref 0–45)
BILIRUB SERPL-MCNC: 1.4 MG/DL (ref 0.2–1.3)
BUN SERPL-MCNC: 17 MG/DL (ref 7–30)
CALCIUM SERPL-MCNC: 9.7 MG/DL (ref 8.5–10.1)
CHLORIDE SERPL-SCNC: 105 MMOL/L (ref 94–109)
CO2 SERPL-SCNC: 28 MMOL/L (ref 20–32)
CREAT SERPL-MCNC: 0.76 MG/DL (ref 0.52–1.04)
GFR SERPL CREATININE-BSD FRML MDRD: 76 ML/MIN/{1.73_M2}
GLUCOSE SERPL-MCNC: 135 MG/DL (ref 70–99)
HBA1C MFR BLD: 6 % (ref 0–5.6)
POTASSIUM SERPL-SCNC: 4.2 MMOL/L (ref 3.4–5.3)
PROT SERPL-MCNC: 7.7 G/DL (ref 6.8–8.8)
SODIUM SERPL-SCNC: 137 MMOL/L (ref 133–144)

## 2021-05-06 PROCEDURE — G0439 PPPS, SUBSEQ VISIT: HCPCS | Performed by: PHYSICIAN ASSISTANT

## 2021-05-06 PROCEDURE — 99214 OFFICE O/P EST MOD 30 MIN: CPT | Mod: 25 | Performed by: PHYSICIAN ASSISTANT

## 2021-05-06 PROCEDURE — 80053 COMPREHEN METABOLIC PANEL: CPT | Performed by: PHYSICIAN ASSISTANT

## 2021-05-06 PROCEDURE — 36415 COLL VENOUS BLD VENIPUNCTURE: CPT | Performed by: PHYSICIAN ASSISTANT

## 2021-05-06 PROCEDURE — 83036 HEMOGLOBIN GLYCOSYLATED A1C: CPT | Performed by: PHYSICIAN ASSISTANT

## 2021-05-06 RX ORDER — IRBESARTAN 150 MG/1
150 TABLET ORAL AT BEDTIME
Qty: 90 TABLET | Refills: 3 | Status: SHIPPED | OUTPATIENT
Start: 2021-05-06

## 2021-05-06 RX ORDER — METOPROLOL SUCCINATE 100 MG/1
100 TABLET, EXTENDED RELEASE ORAL DAILY
Qty: 90 TABLET | Refills: 3 | Status: SHIPPED | OUTPATIENT
Start: 2021-05-06

## 2021-05-06 RX ORDER — POTASSIUM CHLORIDE 1500 MG/1
20 TABLET, EXTENDED RELEASE ORAL DAILY
Qty: 90 TABLET | Refills: 3 | Status: SHIPPED | OUTPATIENT
Start: 2021-05-06

## 2021-05-06 RX ORDER — LORAZEPAM 0.5 MG/1
0.5 TABLET ORAL
Qty: 30 TABLET | Refills: 0 | Status: SHIPPED | OUTPATIENT
Start: 2021-05-06

## 2021-05-06 RX ORDER — METFORMIN HCL 500 MG
500 TABLET, EXTENDED RELEASE 24 HR ORAL 2 TIMES DAILY WITH MEALS
Qty: 180 TABLET | Refills: 1 | Status: SHIPPED | OUTPATIENT
Start: 2021-05-06 | End: 2021-11-16

## 2021-05-06 ASSESSMENT — ACTIVITIES OF DAILY LIVING (ADL): CURRENT_FUNCTION: NO ASSISTANCE NEEDED

## 2021-05-06 ASSESSMENT — MIFFLIN-ST. JEOR: SCORE: 1368.61

## 2021-05-06 NOTE — TELEPHONE ENCOUNTER
Question set received. Placed in [facility] folder for completion.    Prior Authorization Retail Medication Request    Medication/Dose: LORazepam (ATIVAN) 0.5 MG tablet  ICD code (if different than what is on RX):  Anxiety [F41.9]   Previously Tried and Failed:   Rationale:      Insurance Name:  [employer]  Insurance ID: [id number]    Pharmacy Information (if different than what is on RX)  Name: [facility] - [address]  Phone:  [phone]

## 2021-05-06 NOTE — PROGRESS NOTES
SUBJECTIVE:   Carmel Nazario is a 75 year old female who presents for Preventive Visit and preop exam      Surgery: Cataract Emulsification  Surgery Location: [facility] [place]  Surgeon: Dr. Nash  Surgery Date: 05/13/21 and 05/27/21  Surgery Time: 0800 am  Fax Number: [phone]      1. No - Have you ever had a heart attack or stroke?  2. No - Have you ever had surgery on your heart or blood vessels, such as a stent, coronary (heart) bypass, or surgery on an artery in the head, neck, heart, or legs?  3. No - Do you have chest pain when you are physically active?  4. No - Do you have a history of heart failure?  5. No - Do you currently have a cold, bronchitis, or symptoms of other respiratory (head and chest) infections?  6. No - Do you have a cough, shortness of breath, or wheezing?  7. No - Do you or anyone in your family have a history of blood clots?  8. No - Do you or anyone in your family have a serious bleeding problem, such as long-lasting bleeding after surgeries or cuts?  9. No - Have you ever had anemia or been told to take iron pills?  10. No - Have you had any abnormal blood loss such as black, tarry or bloody stools, or abnormal vaginal bleeding?  11. No - Have you ever had a blood transfusion?  12. Yes - Are you willing to have a blood transfusion if it is medically needed before, during, or after your surgery?  13. No - Have you or anyone in your family ever had problems with anesthesia (sedation for surgery)?  14. No - Do you have sleep apnea, excessive snoring, or daytime drowsiness?   15. No - Do you have any artifical heart valves or other implanted medical devices, such as a pacemaker, defibrillator, or continuous glucose monitor?  16. Yes - Do you have any artifical joints?   17. No - Are you allergic to latex?  18. No - Is there any chance that you may be pregnant?      HPI:    Pt had COVID in the fall and developed diplopia and hyponatremia  See notes from endo and neuro in  "Epic    HPI: (neuro note 4/22/21)  Ms. Nazario is a 75-year-old woman who had an apoplectic event of the pituitary adenoma last year. She had a Covid infection and presented with diplopia nausea vomiting and hyponatremia the MRI revealed bleeding of the adenoma but she was treated with steroids and a follow-up appointment the adenoma had destroyed itself after the bleed. 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      Pt did call Dr. Zhang in ENT regarding her hearing and told the hearing loss L ear due to covid  She may need to get a hearing aid    She has some nausea in the morning. Wonders if she needs to continue prilosec since not sure if made any difference.    Patient has been advised of split billing requirements and indicates understanding: Yes   Are you in the first 12 months of your Medicare coverage?  No    Healthy Habits:    In general, how would you rate your overall health?  Very good    Frequency of exercise:  None    Do you usually eat at least 4 servings of fruit and vegetables a day, include whole grains    & fiber and avoid regularly eating high fat or \"junk\" foods?  Yes    Taking medications regularly:  Yes    Barriers to taking medications:  None    Medication side effects:  Other (GI upset with Metformin, would like to discuss switching to Extended Release)    Ability to successfully perform activities of daily living:  No assistance needed    Home Safety:  No safety concerns identified    Hearing Impairment:  No hearing concerns    In the past 6 months, have you been bothered by leaking of urine?  No    In general, how would you rate your overall mental or emotional health?  Very good      PHQ-2 Total Score:    Additional concerns today:  No      Do you feel safe in your environment? Yes    Have you ever done Advance Care Planning? (For " example, a Health Directive, POLST, or a discussion with a medical provider or your loved ones about your wishes): Yes, patient states has an Advance Care Planning document and will bring a copy to the clinic.       Fall risk  Fallen 2 or more times in the past year?: No  Any fall with injury in the past year?: No      Do you have sleep apnea, excessive snoring or daytime drowsiness?: no    Reviewed and updated as needed this visit by clinical staff  Tobacco  Allergies               Reviewed and updated as needed this visit by Provider   Allergies              Social History     Tobacco Use     Smoking status: Former Smoker     Packs/day: 1.00     Years: 20.00     Pack years: 20.00     Types: Cigarettes     Start date: 1968     Quit date: 1987     Years since quittin.9     Smokeless tobacco: Never Used   Substance Use Topics     Alcohol use: Yes     Alcohol/week: 0.0 standard drinks     Comment: Occasional     If you drink alcohol do you typically have >3 drinks per day or >7 drinks per week? No    Alcohol Use 2021   Prescreen: >3 drinks/day or >7 drinks/week? -   Prescreen: >3 drinks/day or >7 drinks/week? No       Past Medical History:   Diagnosis Date     Anxiety      Clinical diagnosis of COVID-19     subsequent L ear hearing loss     Colon polyp     colonoscopy 2006     Combined forms of age-related cataract of both eyes      Diabetes mellitus (H)      Fatty liver disease, nonalcoholic      Hip bursitis      HTN (hypertension), benign      Hyperlipidemia LDL goal < 100      OA (osteoarthritis) of hip     Dr. Garnica     Osteopenia     tried fosamax, but needed bone graft to jaw     Pituitary adenoma (H)     bled  and adenoma resolved     PSVT (paroxysmal supraventricular tachycardia) (H)      Renal lithiasis      Skin cancer     BCC s/p Mohs     Type 2 diabetes, HbA1c goal < 7% (H)      Past Surgical History:   Procedure Laterality Date     BIOPSY      Cheek     C TOTAL HIP  "ARTHROPLASTY  5-2014    R     COLONOSCOPY  2017    Polyp     corrective eye surgeries       GYN SURGERY  1985    Tubes tied     LAPAROSCOPIC CHOLECYSTECTOMY       Current Outpatient Medications   Medication Sig Dispense Refill     cevimeline (EVOXAC) 30 MG capsule 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       LORazepam (ATIVAN) 0.5 MG tablet Take 1 tablet (0.5 mg) by mouth nightly as needed for anxiety 30 tablet 0     magic mouthwash (ENTER INGREDIENTS IN COMMENTS) suspension Dexamethasone/Nystatin/Lidocaine Rinse       metFORMIN (GLUCOPHAGE-XR) 500 MG 24 hr tablet Take 1 tablet (500 mg) by mouth 2 times daily (with meals) 180 tablet 1     metoprolol succinate ER (TOPROL-XL) 100 MG 24 hr tablet Take 1 tablet (100 mg) by mouth daily 90 tablet 3     omeprazole (PRILOSEC) 40 MG DR capsule TAKE 1 CAPSULE DAILY 90 capsule 3     potassium chloride ER (KLOR-CON) 20 MEQ CR tablet Take 1 tablet (20 mEq) by mouth daily 90 tablet 3         Review of Systems  Constitutional, HEENT, cardiovascular, pulmonary, GI, , musculoskeletal, neuro, skin, endocrine and psych systems are negative, except as otherwise noted.    OBJECTIVE:   BP (!) 142/84 (BP Location: Right arm, Patient Position: Sitting, Cuff Size: Adult Regular)   Pulse 67   Temp 97.7  F (36.5  C) (Temporal)   Ht 1.676 m (5' 6\")   Wt 85.2 kg (187 lb 12.8 oz)   SpO2 97%   BMI 30.31 kg/m   Estimated body mass index is 30.31 kg/m  as calculated from the following:    Height as of this encounter: 1.676 m (5' 6\").    Weight as of this encounter: 85.2 kg (187 lb 12.8 oz).  Physical Exam  GENERAL APPEARANCE: healthy, alert and no distress  EYES: Eyes grossly normal to inspection, PERRL and " conjunctivae and sclerae normal  HENT: ear canals and TM's normal, nose and mouth without ulcers or lesions, oropharynx clear and oral mucous membranes moist  NECK: no adenopathy, no asymmetry, masses, or scars and thyroid normal to palpation  RESP: lungs clear to auscultation - no rales, rhonchi or wheezes  BREAST: normal without masses, tenderness or nipple discharge and no palpable axillary masses or adenopathy  CV: regular rate and rhythm, normal S1 S2, no S3 or S4, no murmur, click or rub, no peripheral edema and peripheral pulses strong  ABDOMEN: soft, nontender, no hepatosplenomegaly, no masses and bowel sounds normal  MS: no musculoskeletal defects are noted and gait is age appropriate without ataxia  SKIN: no suspicious lesions or rashes  NEURO: Normal strength and tone, sensory exam grossly normal, mentation intact and speech normal  PSYCH: mentation appears normal and affect normal/bright    Results for orders placed or performed in visit on 05/06/21   Hemoglobin A1c     Status: Abnormal   Result Value Ref Range    Hemoglobin A1C 6.0 (H) 0 - 5.6 %         ASSESSMENT / PLAN:   Assessment and Plan:     (Z00.00) Encounter for annual wellness visit (AWV) in Medicare patient  (primary encounter diagnosis)  Comment:   Plan: Mammogram and colonoscopy up to date.      (Z01.818) Preop general physical exam  Comment:   Plan: medically cleared for surgery as planned    (H26.9) Cataract of both eyes, unspecified cataract type  Comment:   Plan: proceed with surgery as planned.  She has been vaccinated for covid.    (I10) HTN (hypertension), benign  Comment: well controlled according to home readings. Very high initially today, came down on recheck  Plan: Comprehensive metabolic panel, irbesartan         (AVAPRO) 150 MG tablet, potassium chloride ER         (KLOR-CON) 20 MEQ CR tablet, metoprolol         succinate ER (TOPROL-XL) 100 MG 24 hr tablet (refills done)            (E11.9) Type 2 diabetes mellitus without  "complication, without long-term current use of insulin (H)  Comment: she has GI SE from IR metformin and would like the XR.  Plan: Hemoglobin A1c, Comprehensive metabolic panel,         metFORMIN (GLUCOPHAGE-XR) 500 MG 24 hr tablet BID. F/u if GI se persist on XR            (F41.9) Anxiety  Comment: rarely uses ativan but would like to have this on hand  Plan: LORazepam (ATIVAN) 0.5 MG tablet        No signs of abuse. Refilled #30          Patient has been advised of split billing requirements and indicates understanding: Yes  COUNSELING:  Reviewed preventive health counseling, as reflected in patient instructions    Estimated body mass index is 30.31 kg/m  as calculated from the following:    Height as of this encounter: 1.676 m (5' 6\").    Weight as of this encounter: 85.2 kg (187 lb 12.8 oz).        She reports that she quit smoking about 33 years ago. Her smoking use included cigarettes. She started smoking about 52 years ago. She has a 20.00 pack-year smoking history. She has never used smokeless tobacco.      Appropriate preventive services were discussed with this patient, including applicable screening as appropriate for cardiovascular disease, diabetes, osteopenia/osteoporosis, and glaucoma.  As appropriate for age/gender, discussed screening for colorectal cancer, prostate cancer, breast cancer, and cervical cancer. Checklist reviewing preventive services available has been given to the patient.    Reviewed patients plan of care and provided an AVS. The Complex Care Plan (for patients with higher acuity and needing more deliberate coordination of services) for Carmel meets the Care Plan requirement. This Care Plan has been established and reviewed with the Patient.    Counseling Resources:  ATP IV Guidelines  Pooled Cohorts Equation Calculator  Breast Cancer Risk Calculator  Breast Cancer: Medication to Reduce Risk  FRAX Risk Assessment  ICSI Preventive Guidelines  Dietary Guidelines for Americans, " 2010  USDA's MyPlate  ASA Prophylaxis  Lung CA Screening    Rimma Casarez PA-C  [facility]    Identified Health Risks:

## 2021-05-07 NOTE — RESULT ENCOUNTER NOTE
Carmel,    Your diabetes control looks great with the hemoglobin A1c at 6%.  Your liver functions continue to be slightly elevated due to fatty liver disease.   Limit the amount of alcohol you drink to keep this from getting worse.  We should recheck these again in 6 months.  Your electrolytes and kidney function tests were normal.    Please follow up in 6 months.  I hope the extended release metformin is better tolerated.    Rimma Casarez PA-C

## 2021-05-11 NOTE — TELEPHONE ENCOUNTER
Prior Authorization Approval    Authorization Effective Date: 4/11/2021  Authorization Expiration Date: 5/11/2022  Medication: LORazepam (ATIVAN) 0.5 MG-APPROVED  Approved Dose/Quantity:    Reference #:     Insurance Company:    Expected CoPay:       CoPay Card Available:      Foundation Assistance Needed:    Which Pharmacy is filling the prescription (Not needed for infusion/clinic administered): [facility] - [address]  Pharmacy Notified: Yes  Patient Notified: Yes   VM LEFT FOR PATIENT TO CALL MAIL ORDER PHARMACY.

## 2021-05-11 NOTE — TELEPHONE ENCOUNTER
Central Prior Authorization Team   Phone: [phone]    PA Initiation    Medication: LORazepam (ATIVAN) 0.5 MG tablet  Insurance Company:    Pharmacy Filling the Rx: [facility] - [address]  Filling Pharmacy Phone: [phone]  Filling Pharmacy Fax: [phone]  Start Date: 5/11/2021

## 2021-09-06 ENCOUNTER — TRANSFERRED RECORDS (OUTPATIENT)
Dept: HEALTH INFORMATION MANAGEMENT | Facility: CLINIC | Age: 76
End: 2021-09-06

## 2021-09-08 ENCOUNTER — TRANSFERRED RECORDS (OUTPATIENT)
Dept: HEALTH INFORMATION MANAGEMENT | Facility: CLINIC | Age: 76
End: 2021-09-08

## 2021-09-15 ENCOUNTER — TRANSFERRED RECORDS (OUTPATIENT)
Dept: HEALTH INFORMATION MANAGEMENT | Facility: CLINIC | Age: 76
End: 2021-09-15

## 2021-09-18 ENCOUNTER — HEALTH MAINTENANCE LETTER (OUTPATIENT)
Age: 76
End: 2021-09-18

## 2021-09-22 ENCOUNTER — TRANSFERRED RECORDS (OUTPATIENT)
Dept: HEALTH INFORMATION MANAGEMENT | Facility: CLINIC | Age: 76
End: 2021-09-22
Payer: MEDICARE

## 2021-10-13 ENCOUNTER — TRANSFERRED RECORDS (OUTPATIENT)
Dept: HEALTH INFORMATION MANAGEMENT | Facility: CLINIC | Age: 76
End: 2021-10-13
Payer: MEDICARE

## 2021-11-05 ENCOUNTER — TRANSFERRED RECORDS (OUTPATIENT)
Dept: HEALTH INFORMATION MANAGEMENT | Facility: CLINIC | Age: 76
End: 2021-11-05
Payer: MEDICARE

## 2021-11-08 ENCOUNTER — MYC MEDICAL ADVICE (OUTPATIENT)
Dept: FAMILY MEDICINE | Facility: CLINIC | Age: 76
End: 2021-11-08
Payer: MEDICARE

## 2021-11-08 DIAGNOSIS — H81.10 BENIGN PAROXYSMAL POSITIONAL VERTIGO, UNSPECIFIED LATERALITY: Primary | ICD-10-CM

## 2021-11-09 NOTE — TELEPHONE ENCOUNTER
Pending PT referral. You will have add the clinic information in the comments.    Shea Flor  Referral Coordinator

## 2021-11-09 NOTE — TELEPHONE ENCOUNTER
I do not see that the comment section contains the requested location by patient in [facility] for Blake Stevens for vertigo. Their phone is [phone]

## 2021-11-10 NOTE — TELEPHONE ENCOUNTER
Lenny Palomares- Can you fax the referral to [phone]. I am working at home and cannot fax. Thank you.    Shea Flor  Referral Coordinator

## 2021-11-10 NOTE — TELEPHONE ENCOUNTER
Order Questions    Question Answer Comment   Preferred Location: Other (external) - Use Comments    Non-internal location selection reason: [facility] Pre[facility]    Scheduling Instructions: Please call to schedule your appointment    Class External referral    Course of Action Evaluation and Treatment    Adult or Pediatrics Adult    Specialty Services: General

## 2021-11-13 ENCOUNTER — HEALTH MAINTENANCE LETTER (OUTPATIENT)
Age: 76
End: 2021-11-13

## 2021-11-14 DIAGNOSIS — E11.9 TYPE 2 DIABETES MELLITUS WITHOUT COMPLICATION, WITHOUT LONG-TERM CURRENT USE OF INSULIN (H): ICD-10-CM

## 2021-11-16 ENCOUNTER — TRANSFERRED RECORDS (OUTPATIENT)
Dept: HEALTH INFORMATION MANAGEMENT | Facility: CLINIC | Age: 76
End: 2021-11-16
Payer: MEDICARE

## 2021-11-16 NOTE — TELEPHONE ENCOUNTER
Routing refill request to provider for review/approval because:  Patient needs to be seen because:  due for 6 mo visit. Notified on   Rachel Will RN

## 2021-11-17 RX ORDER — METFORMIN HCL 500 MG
TABLET, EXTENDED RELEASE 24 HR ORAL
Qty: 180 TABLET | Refills: 0 | Status: SHIPPED | OUTPATIENT
Start: 2021-11-17

## 2021-12-01 ENCOUNTER — TRANSFERRED RECORDS (OUTPATIENT)
Dept: HEALTH INFORMATION MANAGEMENT | Facility: CLINIC | Age: 76
End: 2021-12-01
Payer: MEDICARE

## 2022-01-18 NOTE — PROGRESS NOTES
SUBJECTIVE:                                                   Carmel Nazario is a 76 year old female who presents to clinic today for the following health issue(s):  Patient presents with:  Vaginal Problem: c/o itching first noticed a few weeks ago, some days feels better but for past 2 weeks has been pretty bad- has tried monistat and a 1 day pill- last time used one of those was a few days ago      HPI:  New patient to me with concerns of external genital itching a few weeks ago.  She states that she has tried topical Monistat that actually made her symptoms worse.  She also tried a single dose of fluconazole that did give her minimal relief for about 24 hours until her symptoms researched.  She is diabetic type II.  She denies any changes to personal hygiene products.    No LMP recorded. Patient is postmenopausal..     Patient is not sexually active, No obstetric history on file..  Using not sexually active for contraception.    reports that she quit smoking about 34 years ago. Her smoking use included cigarettes. She started smoking about 53 years ago. She has a 20.00 pack-year smoking history. She has never used smokeless tobacco.    STD testing offered?  Declined    Health maintenance updated:  yes    Today's PHQ-2 Score:   PHQ-2 ( 1999 Pfizer) 5/6/2021   Q1: Little interest or pleasure in doing things 0   Q2: Feeling down, depressed or hopeless 0   PHQ-2 Score 0   PHQ-2 Total Score (12-17 Years)- Positive if 3 or more points; Administer PHQ-A if positive 0   Q1: Little interest or pleasure in doing things -   Q2: Feeling down, depressed or hopeless -   PHQ-2 Score -     Today's PHQ-9 Score: No flowsheet data found.  Today's LUCI-7 Score: No flowsheet data found.    Problem list and histories reviewed & adjusted, as indicated.  Additional history: as documented.    Patient Active Problem List   Diagnosis     HTN (hypertension), benign     Hyperlipidemia with target LDL less than 100     PSVT (paroxysmal  supraventricular tachycardia) (H)     Renal lithiasis     Osteopenia     Colon polyp     Fatty liver disease, nonalcoholic     Skin cancer     Hip bursitis     Anxiety     Diabetes mellitus, type 2 (H)     Past Surgical History:   Procedure Laterality Date     BIOPSY      Cheek     COLONOSCOPY  2017    Polyp     EYE SURGERY      cataracts     GYN SURGERY  1985    Tubes tied     LAPAROSCOPIC CHOLECYSTECTOMY       ZZC TOTAL HIP ARTHROPLASTY  2014    R      Social History     Tobacco Use     Smoking status: Former Smoker     Packs/day: 1.00     Years: 20.00     Pack years: 20.00     Types: Cigarettes     Start date: 1968     Quit date: 1987     Years since quittin.6     Smokeless tobacco: Never Used   Substance Use Topics     Alcohol use: Yes     Alcohol/week: 0.0 standard drinks     Comment: Occasional      Problem (# of Occurrences) Relation (Name,Age of Onset)    Cancer (1) Father (Cory): skin    Cancer - colorectal (1) Brother    Cerebrovascular Disease (1) Brother    Colon Cancer (1) Brother (Cory)    Hypertension (1) Father (Cory)    No Known Problems (7) Mother, Sister, Maternal Grandmother, Maternal Grandfather, Paternal Grandmother, Paternal Grandfather, Other            Current Outpatient Medications   Medication Sig     betamethasone dipropionate (DIPROSONE) 0.05 % external ointment Apply topically 2 times daily For 2 weeks to external genitalia. Take a 2 week break and repeat if needed     fluconazole (DIFLUCAN) 150 MG tablet Take 1 tablet (150 mg) by mouth daily for 3 days     irbesartan (AVAPRO) 150 MG tablet Take 1 tablet (150 mg) by mouth At Bedtime     latanoprost (XALATAN) 0.005 % ophthalmic solution 1 drop At Bedtime.     metFORMIN (GLUCOPHAGE-XR) 500 MG 24 hr tablet TAKE 1 TABLET TWICE A DAY WITH MEALS     metoprolol succinate ER (TOPROL-XL) 100 MG 24 hr tablet Take 1 tablet (100 mg) by mouth daily     omeprazole (PRILOSEC) 40 MG DR capsule TAKE 1 CAPSULE DAILY      "potassium chloride ER (KLOR-CON) 20 MEQ CR tablet Take 1 tablet (20 mEq) by mouth daily     LORazepam (ATIVAN) 0.5 MG tablet Take 1 tablet (0.5 mg) by mouth nightly as needed for anxiety (Patient not taking: Reported on 1/19/2022)     No current facility-administered medications for this visit.     Allergies   Allergen Reactions     Alendronic Acid Other (See Comments)     Jaw problems     Fosamax [Alendronate Sodium]      Jaw problems     Sulfa Drugs      GI upset     Sulfamethoxazole-Trimethoprim Other (See Comments)     GI upset     Penicillins Other (See Comments) and Rash     rash  Comment: , Description:        ROS:  12 point review of systems negative other than symptoms noted below or in the HPI.  Genitourinary: Vaginal Itching  No urinary frequency or dysuria, bladder or kidney problems      OBJECTIVE:     /80   Pulse 78   Ht 1.676 m (5' 6\")   Wt 84.7 kg (186 lb 12.8 oz)   BMI 30.15 kg/m    Body mass index is 30.15 kg/m .    Exam:  Constitutional:  Appearance: Well nourished, well developed alert, in no acute distress  Psychiatric:  Mentation appears normal and affect normal/bright.  Pelvic Exam:  External Genitalia:     Normal appearance for age, no discharge present, no tenderness present, no inflammatory lesions present, erythematous.  No open lesions.  No white tissue  Vagina:     Normal vaginal vault without central or paravaginal defects, ATROPHIC  Urethra:   Urethral Body:  Urethra palpation normal, urethra structural support normal  Cervix:     Appearance healthy, no lesions present, nontender to palpation, no bleeding present  Perineum:     Perineum within normal limits, no evidence of trauma, no rashes or skin lesions present  Inguinal Lymph Nodes:     No lymphadenopathy present       In-Clinic Test Results:  Results for orders placed or performed in visit on 01/19/22 (from the past 24 hour(s))   Wet prep - Clinic Collect    Specimen: Vagina; Swab   Result Value Ref Range    Trichomonas " Absent Absent    Yeast Absent Absent    Clue Cells Absent Absent    WBCs/high power field 2+ (A) None       ASSESSMENT/PLAN:                                                        ICD-10-CM    1. Vulvar irritation  N90.89 Wet prep - Clinic Collect     fluconazole (DIFLUCAN) 150 MG tablet     betamethasone dipropionate (DIPROSONE) 0.05 % external ointment       There are no Patient Instructions on file for this visit.    76-year-old female with vulvar irritation.  Her wet prep was negative today.  We will treat her for a cutaneous yeast infection as well as cover her with topical steroids.  She is to call if her symptoms do not improve.    STEPHIE Watts CNP  [facility] [place]

## 2022-01-19 ENCOUNTER — OFFICE VISIT (OUTPATIENT)
Dept: OBGYN | Facility: CLINIC | Age: 77
End: 2022-01-19
Payer: MEDICARE

## 2022-01-19 VITALS
DIASTOLIC BLOOD PRESSURE: 80 MMHG | HEIGHT: 66 IN | WEIGHT: 186.8 LBS | SYSTOLIC BLOOD PRESSURE: 122 MMHG | BODY MASS INDEX: 30.02 KG/M2 | HEART RATE: 78 BPM

## 2022-01-19 DIAGNOSIS — N90.89 VULVAR IRRITATION: Primary | ICD-10-CM

## 2022-01-19 LAB
CLUE CELLS: ABNORMAL
TRICHOMONAS, WET PREP: ABNORMAL
WBC'S/HIGH POWER FIELD, WET PREP: ABNORMAL
YEAST, WET PREP: ABNORMAL

## 2022-01-19 PROCEDURE — 87210 SMEAR WET MOUNT SALINE/INK: CPT | Performed by: NURSE PRACTITIONER

## 2022-01-19 PROCEDURE — 99203 OFFICE O/P NEW LOW 30 MIN: CPT | Performed by: NURSE PRACTITIONER

## 2022-01-19 RX ORDER — FLUCONAZOLE 150 MG/1
150 TABLET ORAL DAILY
Qty: 3 TABLET | Refills: 0 | Status: SHIPPED | OUTPATIENT
Start: 2022-01-19 | End: 2022-01-22

## 2022-01-19 RX ORDER — BETAMETHASONE DIPROPIONATE 0.05 %
OINTMENT (GRAM) TOPICAL 2 TIMES DAILY
Qty: 50 G | Refills: 3 | Status: SHIPPED | OUTPATIENT
Start: 2022-01-19 | End: 2022-11-29

## 2022-01-19 ASSESSMENT — MIFFLIN-ST. JEOR: SCORE: 1359.07

## 2022-02-23 ENCOUNTER — TRANSFERRED RECORDS (OUTPATIENT)
Dept: HEALTH INFORMATION MANAGEMENT | Facility: CLINIC | Age: 77
End: 2022-02-23
Payer: MEDICARE

## 2022-02-25 ENCOUNTER — TELEPHONE (OUTPATIENT)
Dept: FAMILY MEDICINE | Facility: CLINIC | Age: 77
End: 2022-02-25
Payer: MEDICARE

## 2022-02-25 NOTE — TELEPHONE ENCOUNTER
Please abstract the following data from this visit with this patient into the appropriate field in Epic:    Tests that can be patient reported without a hard copy:    Mammogram done on this date: 02/23/2022 (approximately), by this group: [facility], results were Normal.

## 2022-03-31 ENCOUNTER — OFFICE VISIT (OUTPATIENT)
Dept: OBGYN | Facility: CLINIC | Age: 77
End: 2022-03-31
Payer: MEDICARE

## 2022-03-31 VITALS
HEIGHT: 66 IN | WEIGHT: 186 LBS | SYSTOLIC BLOOD PRESSURE: 130 MMHG | BODY MASS INDEX: 29.89 KG/M2 | DIASTOLIC BLOOD PRESSURE: 86 MMHG

## 2022-03-31 DIAGNOSIS — N90.89 VULVAR IRRITATION: Primary | ICD-10-CM

## 2022-03-31 PROCEDURE — 99212 OFFICE O/P EST SF 10 MIN: CPT | Performed by: NURSE PRACTITIONER

## 2022-03-31 NOTE — PROGRESS NOTES
SUBJECTIVE:                                                   Carmel Nazario is a 76 year old female who presents to clinic today for the following health issue(s):  Patient presents with:  Follow Up    HPI:  Patient here today for follow-up of some vulvar irritation.  We saw her back in January and treated her with topical steroids as well as oral Diflucan.    She states that the ointment does give her relief but struggles on the off weeks.    No LMP recorded. Patient is postmenopausal.    Patient is not sexually active, No obstetric history on file.  Using menopause for contraception.    reports that she quit smoking about 34 years ago. Her smoking use included cigarettes. She started smoking about 53 years ago. She has a 20.00 pack-year smoking history. She has never used smokeless tobacco.    STD testing offered?  Declined    Health maintenance updated:  yes    Today's PHQ-2 Score:   PHQ-2 ( 1999 [employer]) 5/6/2021   Q1: Little interest or pleasure in doing things 0   Q2: Feeling down, depressed or hopeless 0   PHQ-2 Score 0   PHQ-2 Total Score (12-17 Years)- Positive if 3 or more points; Administer PHQ-A if positive 0   Q1: Little interest or pleasure in doing things -   Q2: Feeling down, depressed or hopeless -   PHQ-2 Score -     Today's PHQ-9 Score: No flowsheet data found.  Today's LUCI-7 Score: No flowsheet data found.    Problem list and histories reviewed & adjusted, as indicated.  Additional history: as documented.    Patient Active Problem List   Diagnosis     HTN (hypertension), benign     Hyperlipidemia with target LDL less than 100     PSVT (paroxysmal supraventricular tachycardia) (H)     Renal lithiasis     Osteopenia     Colon polyp     Fatty liver disease, nonalcoholic     Skin cancer     Hip bursitis     Anxiety     Diabetes mellitus, type 2 (H)     Past Surgical History:   Procedure Laterality Date     BIOPSY  2014    Cheek     COLONOSCOPY  2017    Polyp     EYE SURGERY      cataracts     GYN  SURGERY  1985    Tubes tied     LAPAROSCOPIC CHOLECYSTECTOMY       [facility] TOTAL HIP ARTHROPLASTY  2014    R      Social History     Tobacco Use     Smoking status: Former Smoker     Packs/day: 1.00     Years: 20.00     Pack years: 20.00     Types: Cigarettes     Start date: 1968     Quit date: 1987     Years since quittin.8     Smokeless tobacco: Never Used   Substance Use Topics     Alcohol use: Yes     Alcohol/week: 0.0 standard drinks     Comment: Occasional      Problem (# of Occurrences) Relation (Name,Age of Onset)    Cancer (1) Father (Cory): skin    Cancer - colorectal (1) Brother    Cerebrovascular Disease (1) Brother    Colon Cancer (1) Brother (Cory)    Hypertension (1) Father (Cory)    No Known Problems (7) Mother, Sister, Maternal Grandmother, Maternal Grandfather, Paternal Grandmother, Paternal Grandfather, Other            Current Outpatient Medications   Medication Sig     betamethasone dipropionate (DIPROSONE) 0.05 % external ointment Apply topically 2 times daily For 2 weeks to external genitalia. Take a 2 week break and repeat if needed     irbesartan (AVAPRO) 150 MG tablet Take 1 tablet (150 mg) by mouth At Bedtime     latanoprost (XALATAN) 0.005 % ophthalmic solution 1 drop At Bedtime.     metFORMIN (GLUCOPHAGE-XR) 500 MG 24 hr tablet TAKE 1 TABLET TWICE A DAY WITH MEALS     metoprolol succinate ER (TOPROL-XL) 100 MG 24 hr tablet Take 1 tablet (100 mg) by mouth daily     potassium chloride ER (KLOR-CON) 20 MEQ CR tablet Take 1 tablet (20 mEq) by mouth daily     LORazepam (ATIVAN) 0.5 MG tablet Take 1 tablet (0.5 mg) by mouth nightly as needed for anxiety (Patient not taking: Reported on 2022)     omeprazole (PRILOSEC) 40 MG DR capsule TAKE 1 CAPSULE DAILY (Patient not taking: Reported on 3/31/2022)     No current facility-administered medications for this visit.     Allergies   Allergen Reactions     Alendronic Acid Other (See Comments)     Jaw problems     Fosamax  "[Alendronate Sodium]      Jaw problems     Sulfa Drugs      GI upset     Sulfamethoxazole-Trimethoprim Other (See Comments)     GI upset     Penicillins Other (See Comments) and Rash     rash  Comment: , Description:        ROS:  12 point review of systems negative other than symptoms noted below or in the HPI.  No urinary frequency or dysuria, bladder or kidney problems      OBJECTIVE:     /86   Ht 1.676 m (5' 6\")   Wt 84.4 kg (186 lb)   BMI 30.02 kg/m    Body mass index is 30.02 kg/m .    Exam:  Constitutional:  Appearance: Well nourished, well developed alert, in no acute distress  Psychiatric:  Mentation appears normal and affect normal/bright.  Pelvic Exam:  External Genitalia:     Normal appearance for age, no discharge present, no tenderness present, no inflammatory lesions present, color normal.  Skin tears on bilateral introitus  Vagina:     Normal vaginal vault without central or paravaginal defects, ATROPHIC  Bladder:     Nontender to palpation  Urethra:   Urethral Body:  Urethra palpation normal, urethra structural support normal   Urethral Meatus:  No erythema or lesions present  Cervix:     Appearance healthy, no lesions present, nontender to palpation, no bleeding present  Uterus:     Nontender to palpation, no masses present, position anteflexed, mobility: normal  Adnexa:     No adnexal tenderness present, no adnexal masses present  Perineum:     Perineum within normal limits, no evidence of trauma, no rashes or skin lesions present  Inguinal Lymph Nodes:     No lymphadenopathy present       In-Clinic Test Results:  No results found for this or any previous visit (from the past 24 hour(s)).    ASSESSMENT/PLAN:                                                        ICD-10-CM    1. Vulvar irritation  N90.89        There are no Patient Instructions on file for this visit.    76-year-old female with improved vulvar irritation with steroid ointment.  We have asked her to continue using this on a " 2-week on 2-week off basis and have instructed her to focus attention at the introitus.  She is to call with persistent symptoms.    STEPHIE Watts CNP  [facility]

## 2022-04-26 ENCOUNTER — TRANSFERRED RECORDS (OUTPATIENT)
Dept: HEALTH INFORMATION MANAGEMENT | Facility: CLINIC | Age: 77
End: 2022-04-26
Payer: MEDICARE

## 2022-06-25 ENCOUNTER — HEALTH MAINTENANCE LETTER (OUTPATIENT)
Age: 77
End: 2022-06-25

## 2022-09-07 ENCOUNTER — TRANSFERRED RECORDS (OUTPATIENT)
Dept: HEALTH INFORMATION MANAGEMENT | Facility: CLINIC | Age: 77
End: 2022-09-07

## 2022-11-20 ENCOUNTER — HEALTH MAINTENANCE LETTER (OUTPATIENT)
Age: 77
End: 2022-11-20

## 2022-11-29 DIAGNOSIS — N90.89 VULVAR IRRITATION: ICD-10-CM

## 2022-11-29 RX ORDER — BETAMETHASONE DIPROPIONATE 0.05 %
OINTMENT (GRAM) TOPICAL
Qty: 45 G | Refills: 0 | Status: SHIPPED | OUTPATIENT
Start: 2022-11-29 | End: 2023-03-29

## 2022-11-29 NOTE — TELEPHONE ENCOUNTER
Requested Prescriptions   Pending Prescriptions Disp Refills     betamethasone dipropionate (DIPROSONE) 0.05 % external ointment [Pharmacy Med Name: BETAMETHASONE DP 0.05% OINT] 45 g 3     Sig: APPLY 2 TIMES DAILY FOR 2 WEEKS TO EXTERNAL GENITALIA. TAKE A 2 WEEK BREAK AND REPEAT IF NEEDED       There is no refill protocol information for this order        Prescription approved per [facility] Refill Protocol.  Chelsea Arciniega RN on 11/29/2022 at 11:33 AM

## 2023-03-29 DIAGNOSIS — N90.89 VULVAR IRRITATION: ICD-10-CM

## 2023-03-29 RX ORDER — BETAMETHASONE DIPROPIONATE 0.05 %
OINTMENT (GRAM) TOPICAL
Qty: 45 G | Refills: 0 | Status: SHIPPED | OUTPATIENT
Start: 2023-03-29

## 2023-03-29 NOTE — TELEPHONE ENCOUNTER
Requested Prescriptions   Pending Prescriptions Disp Refills     betamethasone dipropionate (DIPROSONE) 0.05 % external ointment [Pharmacy Med Name: BETAMETHASONE DP 0.05% OINT] 45 g 0     Sig: APPLY 2 TIMES DAILY FOR 2 WEEKS TO EXTERNAL GENITALIA. TAKE A 2 WEEK BREAK AND REPEAT IF NEEDED       There is no refill protocol information for this order        Last Written Prescription Date:  11/29/22  Last Fill Quantity: 45g,  # refills: 0   Last office visit: 3/31/2022 ; last virtual visit: Visit date not found with prescribing provider:  Estela Minaya NP   Future Office Visit:        Medication is being filled for 1 time refill only due to:  Patient needs to be seen because it has been more than one year since last visit.  Estela Bishop RN on 3/29/2023 at 9:31 AM

## 2023-04-12 NOTE — PROGRESS NOTES
SUBJECTIVE:                                                   Carmel Nazario is a 77 year old female who presents to clinic today for the following health issue(s):  Patient presents with:  Medication Follow-up: Betamethasone cream for vaginal itching and irritation. Using 2 weeks on and 2 weeks off. Has good relief of symptoms when using cream, however struggling with symptoms during off week.    HPI:  Patient here today for recheck of vulvar irritation.  She uses her steroid ointment twice daily 2 weeks on 2 weeks off and does have symptoms within 8 days of being on her off weeks.        No LMP recorded. Patient is postmenopausal.    Patient is not sexually active, .  Using not sexually active for contraception.    reports that she quit smoking about 35 years ago. Her smoking use included cigarettes. She started smoking about 54 years ago. She has a 20.00 pack-year smoking history. She has never used smokeless tobacco.    STD testing offered?  Declined    Health maintenance updated:  yes    Today's PHQ-2 Score:       2023     9:15 AM   PHQ-2 (  [employer])   Q1: Little interest or pleasure in doing things 0   Q2: Feeling down, depressed or hopeless 0   PHQ-2 Score 0     Today's PHQ-9 Score:       2023     9:15 AM   PHQ-9 SCORE   PHQ-9 Total Score 0     Today's LUCI-7 Score:       2023     9:15 AM   LUCI-7 SCORE   Total Score 0       Problem list and histories reviewed & adjusted, as indicated.  Additional history: as documented.    Patient Active Problem List   Diagnosis     HTN (hypertension), benign     Hyperlipidemia with target LDL less than 100     PSVT (paroxysmal supraventricular tachycardia) (H)     Renal lithiasis     Osteopenia     Colon polyp     Fatty liver disease, nonalcoholic     Skin cancer     Hip bursitis     Anxiety     Diabetes mellitus, type 2 (H)     Past Surgical History:   Procedure Laterality Date     BIOPSY      Cheek     COLONOSCOPY  2017    Polyp     EYE SURGERY       cataracts     GYN SURGERY  1985    Tubes tied     LAPAROSCOPIC CHOLECYSTECTOMY       [facility] TOTAL HIP ARTHROPLASTY  2014    R      Social History     Tobacco Use     Smoking status: Former     Packs/day: 1.00     Years: 20.00     Pack years: 20.00     Types: Cigarettes     Start date: 1968     Quit date: 1987     Years since quittin.8     Smokeless tobacco: Never   Vaping Use     Vaping status: Not on file   Substance Use Topics     Alcohol use: Yes     Alcohol/week: 0.0 standard drinks of alcohol     Comment: Occasional      Problem (# of Occurrences) Relation (Name,Age of Onset)    Cancer (1) Father (Cory): skin    Hypertension (1) Father (Cory)    Cerebrovascular Disease (1) Brother    Cancer - colorectal (1) Brother    Colon Cancer (1) Brother (Cory)    No Known Problems (7) Mother, Sister, Maternal Grandmother, Maternal Grandfather, Paternal Grandmother, Paternal Grandfather, Other            Current Outpatient Medications   Medication Sig     betamethasone dipropionate (DIPROSONE) 0.05 % external ointment APPLY 2 TIMES DAILY FOR 2 WEEKS TO EXTERNAL GENITALIA. TAKE A 2 WEEK BREAK AND REPEAT IF NEEDED     clobetasol (TEMOVATE) 0.05 % external ointment Apply topically 2 times daily To external genitalia. 2 weeks on 2 weeks off and repeat.     irbesartan (AVAPRO) 150 MG tablet Take 1 tablet (150 mg) by mouth At Bedtime     latanoprost (XALATAN) 0.005 % ophthalmic solution 1 drop At Bedtime.     LORazepam (ATIVAN) 0.5 MG tablet Take 1 tablet (0.5 mg) by mouth nightly as needed for anxiety     metFORMIN (GLUCOPHAGE-XR) 500 MG 24 hr tablet TAKE 1 TABLET TWICE A DAY WITH MEALS     metoprolol succinate ER (TOPROL-XL) 100 MG 24 hr tablet Take 1 tablet (100 mg) by mouth daily     omeprazole (PRILOSEC) 40 MG DR capsule TAKE 1 CAPSULE DAILY     potassium chloride ER (KLOR-CON) 20 MEQ CR tablet Take 1 tablet (20 mEq) by mouth daily     No current facility-administered medications for this visit.  "    Allergies   Allergen Reactions     Alendronic Acid Other (See Comments)     Jaw problems     Fosamax [Alendronate Sodium]      Jaw problems     Sulfa Drugs      GI upset     Sulfamethoxazole-Trimethoprim Other (See Comments)     GI upset     Penicillins Other (See Comments) and Rash     rash  Comment: , Description:        ROS:  12 point review of systems negative other than symptoms noted below or in the HPI.  No urinary frequency or dysuria, bladder or kidney problems      OBJECTIVE:     /86   Ht 1.676 m (5' 6\")   Wt 85.7 kg (189 lb)   BMI 30.51 kg/m    Body mass index is 30.51 kg/m .    Exam:  Constitutional:  Appearance: Well nourished, well developed alert, in no acute distress  Psychiatric:  Mentation appears normal and affect normal/bright.  Pelvic Exam:  External Genitalia:     Normal appearance for age, no discharge present, no tenderness present, no inflammatory lesions present, color normal.  No active lichen sclerosus noted on exam.  Inguinal Lymph Nodes:     No lymphadenopathy present  Pubic Hair:     Normal pubic hair distribution for age  Genitalia and Groin:     No rashes present, no lesions present, no areas of discoloration, no masses present       In-Clinic Test Results:  No results found for this or any previous visit (from the past 24 hour(s)).    ASSESSMENT/PLAN:                                                        ICD-10-CM    1. Vulvar irritation  N90.89 clobetasol (TEMOVATE) 0.05 % external ointment     DISCONTINUED: clobetasol (TEMOVATE) 0.05 % external ointment          There are no Patient Instructions on file for this visit.    77-year-old postmenopausal female with a history of lichen sclerosis that seems to be well controlled with steroid ointment.  We will change her over to clobetasol and see if this gives her better relief on her off weeks.  We again reiterated topical modalities that she can use during her 2 weeks off.  We did encourage continued annual " visits.    STEPHIE Watts CNP  [facility] [place]

## 2023-04-13 ENCOUNTER — OFFICE VISIT (OUTPATIENT)
Dept: OBGYN | Facility: CLINIC | Age: 78
End: 2023-04-13
Payer: MEDICARE

## 2023-04-13 VITALS
DIASTOLIC BLOOD PRESSURE: 86 MMHG | SYSTOLIC BLOOD PRESSURE: 132 MMHG | HEIGHT: 66 IN | BODY MASS INDEX: 30.37 KG/M2 | WEIGHT: 189 LBS

## 2023-04-13 DIAGNOSIS — N90.89 VULVAR IRRITATION: Primary | ICD-10-CM

## 2023-04-13 PROCEDURE — 99213 OFFICE O/P EST LOW 20 MIN: CPT | Performed by: NURSE PRACTITIONER

## 2023-04-13 RX ORDER — CLOBETASOL PROPIONATE 0.5 MG/G
OINTMENT TOPICAL 2 TIMES DAILY
Qty: 60 G | Refills: 4 | Status: SHIPPED | OUTPATIENT
Start: 2023-04-13 | End: 2023-04-13

## 2023-04-13 RX ORDER — CLOBETASOL PROPIONATE 0.5 MG/G
OINTMENT TOPICAL 2 TIMES DAILY
Qty: 60 G | Refills: 4 | Status: SHIPPED | OUTPATIENT
Start: 2023-04-13

## 2023-04-13 ASSESSMENT — ANXIETY QUESTIONNAIRES
7. FEELING AFRAID AS IF SOMETHING AWFUL MIGHT HAPPEN: NOT AT ALL
GAD7 TOTAL SCORE: 0
5. BEING SO RESTLESS THAT IT IS HARD TO SIT STILL: NOT AT ALL
1. FEELING NERVOUS, ANXIOUS, OR ON EDGE: NOT AT ALL
GAD7 TOTAL SCORE: 0
6. BECOMING EASILY ANNOYED OR IRRITABLE: NOT AT ALL
2. NOT BEING ABLE TO STOP OR CONTROL WORRYING: NOT AT ALL
3. WORRYING TOO MUCH ABOUT DIFFERENT THINGS: NOT AT ALL

## 2023-04-13 ASSESSMENT — PATIENT HEALTH QUESTIONNAIRE - PHQ9
5. POOR APPETITE OR OVEREATING: NOT AT ALL
SUM OF ALL RESPONSES TO PHQ QUESTIONS 1-9: 0

## 2023-09-16 ENCOUNTER — HEALTH MAINTENANCE LETTER (OUTPATIENT)
Age: 78
End: 2023-09-16

## 2024-04-13 ENCOUNTER — HEALTH MAINTENANCE LETTER (OUTPATIENT)
Age: 79
End: 2024-04-13

## 2024-04-19 ENCOUNTER — TELEPHONE (OUTPATIENT)
Dept: OBGYN | Facility: CLINIC | Age: 79
End: 2024-04-19
Payer: MEDICARE

## 2024-04-19 NOTE — TELEPHONE ENCOUNTER
Pt calling with some pressure in her bladder. Progressed to spasms with her bladder.  Now having some urinary incontinence and noticed some blood in the toilet.  Recommend pt be seen today. Can check with PCP or UC as we do not have a ny appts available.  Pt verbalized understanding, in agreement with plan, and voiced no further questions..  Jeffrey Villa RN on 4/19/2024 at 12:40 PM

## 2024-06-22 ENCOUNTER — HEALTH MAINTENANCE LETTER (OUTPATIENT)
Age: 79
End: 2024-06-22

## 2024-11-03 ENCOUNTER — HEALTH MAINTENANCE LETTER (OUTPATIENT)
Age: 79
End: 2024-11-03

## 2025-03-24 ENCOUNTER — OFFICE VISIT (OUTPATIENT)
Dept: OBGYN | Facility: CLINIC | Age: 80
End: 2025-03-24
Payer: MEDICARE

## 2025-03-24 VITALS
SYSTOLIC BLOOD PRESSURE: 138 MMHG | DIASTOLIC BLOOD PRESSURE: 74 MMHG | BODY MASS INDEX: 31.62 KG/M2 | HEIGHT: 65 IN | WEIGHT: 189.8 LBS

## 2025-03-24 DIAGNOSIS — N90.89 VULVAR IRRITATION: Primary | ICD-10-CM

## 2025-03-24 PROCEDURE — 3078F DIAST BP <80 MM HG: CPT | Performed by: NURSE PRACTITIONER

## 2025-03-24 PROCEDURE — G2211 COMPLEX E/M VISIT ADD ON: HCPCS | Performed by: NURSE PRACTITIONER

## 2025-03-24 PROCEDURE — 3075F SYST BP GE 130 - 139MM HG: CPT | Performed by: NURSE PRACTITIONER

## 2025-03-24 PROCEDURE — 99459 PELVIC EXAMINATION: CPT | Performed by: NURSE PRACTITIONER

## 2025-03-24 PROCEDURE — 99214 OFFICE O/P EST MOD 30 MIN: CPT | Performed by: NURSE PRACTITIONER

## 2025-03-24 RX ORDER — ESTRADIOL 0.1 MG/G
0.5 CREAM VAGINAL AT BEDTIME
Qty: 42.5 G | Refills: 3 | Status: SHIPPED | OUTPATIENT
Start: 2025-03-24

## 2025-03-24 RX ORDER — BETAMETHASONE DIPROPIONATE 0.05 %
OINTMENT (GRAM) TOPICAL
Qty: 45 G | Refills: 0 | Status: CANCELLED | OUTPATIENT
Start: 2025-03-24

## 2025-03-24 RX ORDER — CLOBETASOL PROPIONATE 0.5 MG/G
OINTMENT TOPICAL 2 TIMES DAILY
Qty: 60 G | Refills: 4 | Status: SHIPPED | OUTPATIENT
Start: 2025-03-24

## 2025-03-24 ASSESSMENT — PATIENT HEALTH QUESTIONNAIRE - PHQ9
SUM OF ALL RESPONSES TO PHQ QUESTIONS 1-9: 0
5. POOR APPETITE OR OVEREATING: NOT AT ALL

## 2025-03-24 ASSESSMENT — ANXIETY QUESTIONNAIRES
GAD7 TOTAL SCORE: 0
IF YOU CHECKED OFF ANY PROBLEMS ON THIS QUESTIONNAIRE, HOW DIFFICULT HAVE THESE PROBLEMS MADE IT FOR YOU TO DO YOUR WORK, TAKE CARE OF THINGS AT HOME, OR GET ALONG WITH OTHER PEOPLE: NOT DIFFICULT AT ALL
5. BEING SO RESTLESS THAT IT IS HARD TO SIT STILL: NOT AT ALL
GAD7 TOTAL SCORE: 0
2. NOT BEING ABLE TO STOP OR CONTROL WORRYING: NOT AT ALL
1. FEELING NERVOUS, ANXIOUS, OR ON EDGE: NOT AT ALL
3. WORRYING TOO MUCH ABOUT DIFFERENT THINGS: NOT AT ALL
7. FEELING AFRAID AS IF SOMETHING AWFUL MIGHT HAPPEN: NOT AT ALL
6. BECOMING EASILY ANNOYED OR IRRITABLE: NOT AT ALL

## 2025-03-24 NOTE — PROGRESS NOTES
SUBJECTIVE:                                                   Carmel Nazario is a 79 year old female who presents to clinic today for the following health issue(s):  Patient presents with:  Physical      HPI:  Patient here today to do a med check on lichen sclerosus.  She has been doing well with her ointment 2 weeks on 2 weeks off.  During her 2 weeks off she gets about 13 days of relief until she starts to get burning again.  We last saw her in  and at that time we changed her from betamethasone to clobetasol.    No LMP recorded. Patient is postmenopausal.    Patient is not sexually active, .  Using not sexually active for contraception.    reports that she quit smoking about 37 years ago. Her smoking use included cigarettes. She started smoking about 56 years ago. She has a 20 pack-year smoking history. She has never used smokeless tobacco.    STD testing offered?  Declined    Health maintenance updated:  yes    Today's PHQ-2 Score:       3/24/2025     1:38 PM   PHQ-2 (  [employer])   Q1: Little interest or pleasure in doing things 0   Q2: Feeling down, depressed or hopeless 0   PHQ-2 Score 0     Today's PHQ-9 Score:       3/24/2025     1:38 PM   PHQ-9 SCORE   PHQ-9 Total Score 0     Today's LUCI-7 Score:       3/24/2025     1:38 PM   LUCI-7 SCORE   Total Score 0       Problem list and histories reviewed & adjusted, as indicated.  Additional history: as documented.    Patient Active Problem List   Diagnosis    HTN (hypertension), benign    Hyperlipidemia with target LDL less than 100    PSVT (paroxysmal supraventricular tachycardia)    Renal lithiasis    Osteopenia    Colon polyp    Fatty liver disease, nonalcoholic    Skin cancer    Hip bursitis    Anxiety    Diabetes mellitus, type 2 (H)     Past Surgical History:   Procedure Laterality Date    BIOPSY      Cheek    COLONOSCOPY  2017    Polyp    EYE SURGERY      cataracts    GYN SURGERY      Tubes tied    LAPAROSCOPIC CHOLECYSTECTOMY      [facility]  TOTAL HIP ARTHROPLASTY  2014    R      Social History     Tobacco Use    Smoking status: Former     Current packs/day: 0.00     Average packs/day: 1 pack/day for 20.0 years (20.0 ttl pk-yrs)     Types: Cigarettes     Start date: 1968     Quit date: 1987     Years since quittin.8    Smokeless tobacco: Never   Substance Use Topics    Alcohol use: Yes     Alcohol/week: 0.0 standard drinks of alcohol     Comment: Occasional      Problem (# of Occurrences) Relation (Name,Age of Onset)    Cancer (1) Father (Cory): skin    Hypertension (1) Father (Cory)    Cerebrovascular Disease (1) Brother    Cancer - colorectal (1) Brother    Colon Cancer (1) Brother (Cory)    No Known Problems (7) Mother, Sister, Maternal Grandmother, Maternal Grandfather, Paternal Grandmother, Paternal Grandfather, Other              Current Outpatient Medications   Medication Sig Dispense Refill    betamethasone dipropionate (DIPROSONE) 0.05 % external ointment APPLY 2 TIMES DAILY FOR 2 WEEKS TO EXTERNAL GENITALIA. TAKE A 2 WEEK BREAK AND REPEAT IF NEEDED 45 g 0    clobetasol (TEMOVATE) 0.05 % external ointment Apply topically 2 times daily. To external genitalia. 2 weeks on 2 weeks off and repeat. 60 g 4    estradiol (ESTRACE) 0.1 MG/GM vaginal cream Place 0.5 g vaginally at bedtime. For 30 nights, then 2 times per week thereafter. Use finger to apply. 42.5 g 3    irbesartan (AVAPRO) 150 MG tablet Take 1 tablet (150 mg) by mouth At Bedtime 90 tablet 3    latanoprost (XALATAN) 0.005 % ophthalmic solution 1 drop At Bedtime.      LORazepam (ATIVAN) 0.5 MG tablet Take 1 tablet (0.5 mg) by mouth nightly as needed for anxiety 30 tablet 0    metFORMIN (GLUCOPHAGE-XR) 500 MG 24 hr tablet TAKE 1 TABLET TWICE A DAY WITH MEALS 180 tablet 0    metoprolol succinate ER (TOPROL-XL) 100 MG 24 hr tablet Take 1 tablet (100 mg) by mouth daily 90 tablet 3    omeprazole (PRILOSEC) 40 MG DR capsule TAKE 1 CAPSULE DAILY 90 capsule 1    potassium  "chloride ER (KLOR-CON) 20 MEQ CR tablet Take 1 tablet (20 mEq) by mouth daily 90 tablet 3     No current facility-administered medications for this visit.     Allergies   Allergen Reactions    Alendronate Other (See Comments)     Jaw problems    Fosamax [Alendronate Sodium]      Jaw problems    Sulfa Antibiotics      GI upset    Sulfamethoxazole-Trimethoprim Other (See Comments)     GI upset    Penicillins Other (See Comments) and Rash     rash  Comment: , Description:        ROS:  12 point review of systems negative other than symptoms noted below or in the HPI.  No urinary frequency or dysuria, bladder or kidney problems      OBJECTIVE:     /74   Ht 1.659 m (5' 5.3\")   Wt 86.1 kg (189 lb 12.8 oz)   BMI 31.29 kg/m    Body mass index is 31.29 kg/m .    Exam:  Constitutional:  Appearance: Well nourished, well developed alert, in no acute distress  Neurologic:  Mental Status:  Oriented X3.  Normal strength and tone, sensory exam grossly normal, mentation intact and speech normal.    Psychiatric:  Mentation appears normal and affect normal/bright.  Pelvic Exam:  External Genitalia:     Normal appearance for age, no discharge present, no tenderness present, no inflammatory lesions present, color normal.  Erythema and tenderness on the left vaginal wall at the introitus.  She has a small skin tear at the posterior fourchette  Urethra:   Urethral Meatus:  No erythema or lesions present  Perineum:     Perineum within normal limits, no evidence of trauma, no rashes or skin lesions present  Anus:     Anus within normal limits, no hemorrhoids present  Inguinal Lymph Nodes:     No lymphadenopathy present  Pubic Hair:     Normal pubic hair distribution for age  Genitalia and Groin:     No rashes present, no lesions present, no areas of discoloration, no masses present     In-Clinic Test Results:  No results found for this or any previous visit (from the past 24 hours).    ASSESSMENT/PLAN:                                "                         ICD-10-CM    1. Vulvar irritation  N90.89 clobetasol (TEMOVATE) 0.05 % external ointment     estradiol (ESTRACE) 0.1 MG/GM vaginal cream          There are no Patient Instructions on file for this visit.    79-year-old postmenopausal female with erythema at the vaginal entrance.  We will have her try vaginal estrogen cream at the entrance and continue with her clobetasol ointment as previously discussed.  We did see her back in 3 months if she has continued discomfort, otherwise, 2 years for med check/refill    STEPHIE Watts CNP [facility] [place]

## 2025-05-10 ENCOUNTER — HEALTH MAINTENANCE LETTER (OUTPATIENT)
Age: 80
End: 2025-05-10

## 2025-07-12 ENCOUNTER — HEALTH MAINTENANCE LETTER (OUTPATIENT)
Age: 80
End: 2025-07-12